# Patient Record
Sex: MALE | Race: BLACK OR AFRICAN AMERICAN | NOT HISPANIC OR LATINO | URBAN - METROPOLITAN AREA
[De-identification: names, ages, dates, MRNs, and addresses within clinical notes are randomized per-mention and may not be internally consistent; named-entity substitution may affect disease eponyms.]

---

## 2018-03-22 ENCOUNTER — INPATIENT (INPATIENT)
Facility: HOSPITAL | Age: 68
LOS: 1 days | Discharge: HOME | End: 2018-03-24
Attending: HOSPITALIST | Admitting: HOSPITALIST
Payer: MEDICARE

## 2018-03-22 VITALS
TEMPERATURE: 99 F | RESPIRATION RATE: 16 BRPM | SYSTOLIC BLOOD PRESSURE: 136 MMHG | DIASTOLIC BLOOD PRESSURE: 98 MMHG | HEART RATE: 77 BPM | OXYGEN SATURATION: 100 %

## 2018-03-22 DIAGNOSIS — R73.9 HYPERGLYCEMIA, UNSPECIFIED: ICD-10-CM

## 2018-03-22 LAB
ALBUMIN SERPL ELPH-MCNC: 4.5 G/DL — SIGNIFICANT CHANGE UP (ref 3.3–5)
ALP SERPL-CCNC: 90 U/L — SIGNIFICANT CHANGE UP (ref 40–120)
ALT FLD-CCNC: 39 U/L — SIGNIFICANT CHANGE UP (ref 4–41)
AST SERPL-CCNC: 30 U/L — SIGNIFICANT CHANGE UP (ref 4–40)
B-OH-BUTYR SERPL-SCNC: 5.2 MMOL/L — HIGH (ref 0–0.4)
BASE EXCESS BLDV CALC-SCNC: -1.2 MMOL/L — SIGNIFICANT CHANGE UP
BASE EXCESS BLDV CALC-SCNC: 1.3 MMOL/L — SIGNIFICANT CHANGE UP
BASOPHILS # BLD AUTO: 0.01 K/UL — SIGNIFICANT CHANGE UP (ref 0–0.2)
BASOPHILS NFR BLD AUTO: 0.2 % — SIGNIFICANT CHANGE UP (ref 0–2)
BILIRUB SERPL-MCNC: 0.6 MG/DL — SIGNIFICANT CHANGE UP (ref 0.2–1.2)
BLOOD GAS VENOUS - CREATININE: 1.17 MG/DL — SIGNIFICANT CHANGE UP (ref 0.5–1.3)
BLOOD GAS VENOUS - CREATININE: 1.46 MG/DL — HIGH (ref 0.5–1.3)
BUN SERPL-MCNC: 28 MG/DL — HIGH (ref 7–23)
BUN SERPL-MCNC: 35 MG/DL — HIGH (ref 7–23)
CALCIUM SERPL-MCNC: 8.8 MG/DL — SIGNIFICANT CHANGE UP (ref 8.4–10.5)
CALCIUM SERPL-MCNC: 9.7 MG/DL — SIGNIFICANT CHANGE UP (ref 8.4–10.5)
CHLORIDE BLDV-SCNC: 100 MMOL/L — SIGNIFICANT CHANGE UP (ref 96–108)
CHLORIDE BLDV-SCNC: 93 MMOL/L — LOW (ref 96–108)
CHLORIDE SERPL-SCNC: 82 MMOL/L — LOW (ref 98–107)
CHLORIDE SERPL-SCNC: 92 MMOL/L — LOW (ref 98–107)
CO2 SERPL-SCNC: 20 MMOL/L — LOW (ref 22–31)
CO2 SERPL-SCNC: 22 MMOL/L — SIGNIFICANT CHANGE UP (ref 22–31)
CREAT SERPL-MCNC: 1.46 MG/DL — HIGH (ref 0.5–1.3)
CREAT SERPL-MCNC: 1.81 MG/DL — HIGH (ref 0.5–1.3)
EOSINOPHIL # BLD AUTO: 0.12 K/UL — SIGNIFICANT CHANGE UP (ref 0–0.5)
EOSINOPHIL NFR BLD AUTO: 1.8 % — SIGNIFICANT CHANGE UP (ref 0–6)
GAS PNL BLDV: 129 MMOL/L — LOW (ref 136–146)
GAS PNL BLDV: 134 MMOL/L — LOW (ref 136–146)
GLUCOSE BLDV-MCNC: 352 — HIGH (ref 70–99)
GLUCOSE BLDV-MCNC: 655 — CRITICAL HIGH (ref 70–99)
GLUCOSE SERPL-MCNC: 342 MG/DL — HIGH (ref 70–99)
GLUCOSE SERPL-MCNC: 627 MG/DL — CRITICAL HIGH (ref 70–99)
HBA1C BLD-MCNC: 15.8 % — HIGH (ref 4–5.6)
HCO3 BLDV-SCNC: 22 MMOL/L — SIGNIFICANT CHANGE UP (ref 20–27)
HCO3 BLDV-SCNC: 23 MMOL/L — SIGNIFICANT CHANGE UP (ref 20–27)
HCT VFR BLD CALC: 49 % — SIGNIFICANT CHANGE UP (ref 39–50)
HCT VFR BLDV CALC: 45 % — SIGNIFICANT CHANGE UP (ref 39–51)
HCT VFR BLDV CALC: 49.8 % — SIGNIFICANT CHANGE UP (ref 39–51)
HGB BLD-MCNC: 15.8 G/DL — SIGNIFICANT CHANGE UP (ref 13–17)
HGB BLDV-MCNC: 14.7 G/DL — SIGNIFICANT CHANGE UP (ref 13–17)
HGB BLDV-MCNC: 16.3 G/DL — SIGNIFICANT CHANGE UP (ref 13–17)
IMM GRANULOCYTES # BLD AUTO: 0.02 # — SIGNIFICANT CHANGE UP
IMM GRANULOCYTES NFR BLD AUTO: 0.3 % — SIGNIFICANT CHANGE UP (ref 0–1.5)
LACTATE BLDV-MCNC: 3 MMOL/L — HIGH (ref 0.5–2)
LACTATE BLDV-MCNC: 4.6 MMOL/L — CRITICAL HIGH (ref 0.5–2)
LYMPHOCYTES # BLD AUTO: 1.46 K/UL — SIGNIFICANT CHANGE UP (ref 1–3.3)
LYMPHOCYTES # BLD AUTO: 22.5 % — SIGNIFICANT CHANGE UP (ref 13–44)
MAGNESIUM SERPL-MCNC: 2.5 MG/DL — SIGNIFICANT CHANGE UP (ref 1.6–2.6)
MCHC RBC-ENTMCNC: 27.6 PG — SIGNIFICANT CHANGE UP (ref 27–34)
MCHC RBC-ENTMCNC: 32.2 % — SIGNIFICANT CHANGE UP (ref 32–36)
MCV RBC AUTO: 85.5 FL — SIGNIFICANT CHANGE UP (ref 80–100)
MONOCYTES # BLD AUTO: 0.57 K/UL — SIGNIFICANT CHANGE UP (ref 0–0.9)
MONOCYTES NFR BLD AUTO: 8.8 % — SIGNIFICANT CHANGE UP (ref 2–14)
NEUTROPHILS # BLD AUTO: 4.31 K/UL — SIGNIFICANT CHANGE UP (ref 1.8–7.4)
NEUTROPHILS NFR BLD AUTO: 66.4 % — SIGNIFICANT CHANGE UP (ref 43–77)
NRBC # FLD: 0 — SIGNIFICANT CHANGE UP
PCO2 BLDV: 45 MMHG — SIGNIFICANT CHANGE UP (ref 41–51)
PCO2 BLDV: 53 MMHG — HIGH (ref 41–51)
PH BLDV: 7.32 PH — SIGNIFICANT CHANGE UP (ref 7.32–7.43)
PH BLDV: 7.34 PH — SIGNIFICANT CHANGE UP (ref 7.32–7.43)
PHOSPHATE SERPL-MCNC: 3.5 MG/DL — SIGNIFICANT CHANGE UP (ref 2.5–4.5)
PLATELET # BLD AUTO: 223 K/UL — SIGNIFICANT CHANGE UP (ref 150–400)
PMV BLD: 11.7 FL — SIGNIFICANT CHANGE UP (ref 7–13)
PO2 BLDV: 24 MMHG — LOW (ref 35–40)
PO2 BLDV: 35 MMHG — SIGNIFICANT CHANGE UP (ref 35–40)
POTASSIUM BLDV-SCNC: 3.2 MMOL/L — LOW (ref 3.4–4.5)
POTASSIUM BLDV-SCNC: 3.7 MMOL/L — SIGNIFICANT CHANGE UP (ref 3.4–4.5)
POTASSIUM SERPL-MCNC: 3.6 MMOL/L — SIGNIFICANT CHANGE UP (ref 3.5–5.3)
POTASSIUM SERPL-MCNC: 4.1 MMOL/L — SIGNIFICANT CHANGE UP (ref 3.5–5.3)
POTASSIUM SERPL-SCNC: 3.6 MMOL/L — SIGNIFICANT CHANGE UP (ref 3.5–5.3)
POTASSIUM SERPL-SCNC: 4.1 MMOL/L — SIGNIFICANT CHANGE UP (ref 3.5–5.3)
PROT SERPL-MCNC: 9 G/DL — HIGH (ref 6–8.3)
RBC # BLD: 5.73 M/UL — SIGNIFICANT CHANGE UP (ref 4.2–5.8)
RBC # FLD: 12.9 % — SIGNIFICANT CHANGE UP (ref 10.3–14.5)
SAO2 % BLDV: 30.6 % — LOW (ref 60–85)
SAO2 % BLDV: 56.5 % — LOW (ref 60–85)
SODIUM SERPL-SCNC: 132 MMOL/L — LOW (ref 135–145)
SODIUM SERPL-SCNC: 137 MMOL/L — SIGNIFICANT CHANGE UP (ref 135–145)
WBC # BLD: 6.49 K/UL — SIGNIFICANT CHANGE UP (ref 3.8–10.5)
WBC # FLD AUTO: 6.49 K/UL — SIGNIFICANT CHANGE UP (ref 3.8–10.5)

## 2018-03-22 RX ORDER — INSULIN HUMAN 100 [IU]/ML
7 INJECTION, SOLUTION SUBCUTANEOUS ONCE
Qty: 0 | Refills: 0 | Status: COMPLETED | OUTPATIENT
Start: 2018-03-22 | End: 2018-03-22

## 2018-03-22 RX ORDER — SODIUM CHLORIDE 9 MG/ML
1000 INJECTION, SOLUTION INTRAVENOUS
Qty: 0 | Refills: 0 | Status: DISCONTINUED | OUTPATIENT
Start: 2018-03-22 | End: 2018-03-23

## 2018-03-22 RX ORDER — HEPARIN SODIUM 5000 [USP'U]/ML
5000 INJECTION INTRAVENOUS; SUBCUTANEOUS EVERY 8 HOURS
Qty: 0 | Refills: 0 | Status: DISCONTINUED | OUTPATIENT
Start: 2018-03-22 | End: 2018-03-23

## 2018-03-22 RX ORDER — SODIUM CHLORIDE 9 MG/ML
2000 INJECTION INTRAMUSCULAR; INTRAVENOUS; SUBCUTANEOUS ONCE
Qty: 0 | Refills: 0 | Status: COMPLETED | OUTPATIENT
Start: 2018-03-22 | End: 2018-03-22

## 2018-03-22 RX ORDER — INSULIN GLARGINE 100 [IU]/ML
12 INJECTION, SOLUTION SUBCUTANEOUS ONCE
Qty: 0 | Refills: 0 | Status: COMPLETED | OUTPATIENT
Start: 2018-03-22 | End: 2018-03-22

## 2018-03-22 RX ORDER — SODIUM CHLORIDE 9 MG/ML
1000 INJECTION INTRAMUSCULAR; INTRAVENOUS; SUBCUTANEOUS ONCE
Qty: 0 | Refills: 0 | Status: COMPLETED | OUTPATIENT
Start: 2018-03-22 | End: 2018-03-22

## 2018-03-22 RX ORDER — POTASSIUM CHLORIDE 20 MEQ
40 PACKET (EA) ORAL ONCE
Qty: 0 | Refills: 0 | Status: COMPLETED | OUTPATIENT
Start: 2018-03-22 | End: 2018-03-22

## 2018-03-22 RX ADMIN — INSULIN GLARGINE 12 UNIT(S): 100 INJECTION, SOLUTION SUBCUTANEOUS at 21:28

## 2018-03-22 RX ADMIN — SODIUM CHLORIDE 125 MILLILITER(S): 9 INJECTION, SOLUTION INTRAVENOUS at 21:04

## 2018-03-22 RX ADMIN — INSULIN HUMAN 7 UNIT(S): 100 INJECTION, SOLUTION SUBCUTANEOUS at 16:25

## 2018-03-22 RX ADMIN — SODIUM CHLORIDE 1000 MILLILITER(S): 9 INJECTION INTRAMUSCULAR; INTRAVENOUS; SUBCUTANEOUS at 18:40

## 2018-03-22 RX ADMIN — SODIUM CHLORIDE 2000 MILLILITER(S): 9 INJECTION INTRAMUSCULAR; INTRAVENOUS; SUBCUTANEOUS at 15:19

## 2018-03-22 RX ADMIN — Medication 40 MILLIEQUIVALENT(S): at 21:05

## 2018-03-22 NOTE — ED ADULT NURSE NOTE - CHIEF COMPLAINT
The patient is a 67y Male complaining of hyperglycemia from pmd office, unable to read.  Pt has not seen a doctor at all, today was brought by friend for polyuria, polydypsia x months.  No pmh, not on any meds.  Pt denies any distress, pain, blurry vision.

## 2018-03-22 NOTE — ED PROVIDER NOTE - ATTENDING CONTRIBUTION TO CARE
Dr. Moscoso:  I have personally performed a face to face bedside history and physical examination of this patient. I have discussed the history, examination, review of systems, assessment and plan of management with the resident. I have reviewed the electronic medical record and amended it to reflect my history, review of systems, physical exam, assessment and plan.    67M sent in by PCP for high blood sugar in office. Pt endorses feeling weak/tired/malaise over past month.  + Polyuria and polydipsia.  Denies fever/chills, cp, sob, n/v/d.  States some years ago, told he had borderline DM.  Not taking meds now.    Exam:  - nad  - rrr  - ctab  - abd soft ntnd    A/P  - hyperglycemia, r/o DKA  - cbc, cmp, vbg, beta-hydroxybutyrate, a1c, ua, urine culture  - IVF

## 2018-03-22 NOTE — H&P ADULT - HISTORY OF PRESENT ILLNESS
Patient is a 68 y/o M w/ a PMHx of HTN and T2DM who was sent to the ED by his PMD for hyperglycemia. Patient reports that he was in his usual state of health until 2-3 weeks ago when he began to develop vague abdominal complaints. He explains that he started to feel a "shaking" diffusely throughout his abdomen which felt very strange. The sensation was intermittent and he was unsure of any aggravating/relieving factors. No known association with food. He took OTC DayQuil and Nyquil for his symptoms without relief. During this time, patient also reports worsening polyuria/polydipsia as well as fatigue. Since patient's symptoms were not improving, he scheduled an appointment with his PMD who he visited on day of presentation. Patient was found to have a FS > 500 and so he was advised to go to the ED for further evaluation. On ROS, patient endorses dry mouth, but denies any recent fever, chills, headache, dizziness, vision changes, chest pain, palpitations, shortness of breath, cough, vomiting, diarrhea, or dysuria. Of note, patient states that his PMD prescribes him Atenolol-Chlorthalidone (100mg/25mg) for HTN and Glipizide (unknown dose) for T2DM; however, he reports that he has seldom been taking these medications. He reports that he recently visited his family in [facility] in 1/2018.    In the ED, patient's VS were: T = 98.7F, HR = 77, BP = 136/98, RR = 16, O2 Sat = 100% on RA. Labs were significant for hyperglycemia (serum glucose = 627), AIYANA (Cr = 1.81), AG = 30, Beta-Hydroxybutyrate of 5.2, lactate of 4.6, pH of 7.34, and a Hemoglobin A1c of 15.8%. Patient was given 3L NS, IV Regular Insulin 7U x1, Lantus 12U x1, PO KCl 40 mEq x1, and was started on LR @ 125cc/hr. Patient's serum glucose improved to 342, AG improved to 23, and lactate improved to 3.0. He was then admitted to Medicine for further management. Patient is a 68 y/o M w/ a PMHx of HTN and T2DM who was sent to the ED by his PMD for hyperglycemia. Patient reports that he was in his usual state of health until 2-3 weeks ago when he began to develop vague abdominal complaints. He explains that he started to feel a "shaking" diffusely throughout his abdomen which felt very strange. The sensation was intermittent and he was unsure of any aggravating/relieving factors. No known association with food. He took OTC DayQuil and Nyquil for his symptoms without relief. During this time, patient also reports worsening polyuria/polydipsia as well as fatigue. Since patient's symptoms were not improving, he scheduled an appointment with his PMD who he visited on day of presentation. Patient was found to have a FS > 500 and so he was advised to go to the ED for further evaluation. On ROS, patient endorses dry mouth, but denies any recent fever, chills, headache, dizziness, vision changes, chest pain, palpitations, shortness of breath, cough, vomiting, diarrhea, or dysuria. Of note, patient states that his PMD prescribes him Atenolol-Chlorthalidone (100mg/25mg) for HTN and Glipizide (unknown dose) for T2DM; however, he reports that he has seldom been taking these medications. He reports that he recently visited his family in [facility] in 1/2018.    In the ED, patient's VS were: T = 98.7F, HR = 77, BP = 136/98, RR = 16, O2 Sat = 100% on RA. Labs were significant for hyperglycemia (serum glucose = 627), AIYANA (Cr = 1.81), AG = 30, Beta-Hydroxybutyrate of 5.2, lactate of 4.6, pH of 7.34, and a Hemoglobin A1c of 15.8%. Patient was given 3L NS, IV Regular Insulin 7U x1, Lantus 12U x1, PO KCl 40 mEq x1, and was started on LR @ 125cc/hr. Patient's serum glucose improved to 342, AG improved to 23, and lactate improved to 3.0. He was then admitted to Medicine for further management. Patient is a 66 y/o M w/ a PMHx of HTN and T2DM who was sent to the ED by his PMD for hyperglycemia. Patient reports that he was in his usual state of health until 2-3 weeks ago when he began to develop vague abdominal complaints. He explains that he started to feel a "shaking" diffusely throughout his abdomen which felt very strange. The sensation was intermittent and he was unsure of any aggravating/relieving factors. No known association with food. He states that he was drinking a lot of juice and taking OTC DayQuil and Nyquil for his symptoms without relief. During this time, patient also reports worsening polyuria/polydipsia as well as fatigue. Since patient's symptoms were not improving, he scheduled an appointment with his PMD who he visited on day of presentation. Patient was found to have a FS > 500 and so he was advised to go to the ED for further evaluation. On ROS, patient endorses dry mouth, but denies any recent fever, chills, headache, dizziness, vision changes, chest pain, palpitations, shortness of breath, cough, vomiting, diarrhea, or dysuria. Of note, patient states that his PMD prescribes him Atenolol-Chlorthalidone (100mg/25mg) for HTN and Glipizide (unknown dose) for T2DM; however, he reports that he has seldom been taking these medications. He reports that he recently visited his family in [facility] in 1/2018.    In the ED, patient's VS were: T = 98.7F, HR = 77, BP = 136/98, RR = 16, O2 Sat = 100% on RA. Labs were significant for hyperglycemia (serum glucose = 627), AIYANA (Cr = 1.81), AG = 30, Beta-Hydroxybutyrate of 5.2, lactate of 4.6, pH of 7.34, and a Hemoglobin A1c of 15.8%. Patient was given 3L NS, IV Regular Insulin 7U x1, Lantus 12U x1, PO KCl 40 mEq x1, and was started on LR @ 125cc/hr. Patient's serum glucose improved to 342, AG improved to 23, and lactate improved to 3.0. He was then admitted to Medicine for further management.

## 2018-03-22 NOTE — H&P ADULT - NSHPPHYSICALEXAM_GEN_ALL_CORE
Vital Signs Last 24 Hrs  T(C): 36.9 (22 Mar 2018 22:47), Max: 37.1 (22 Mar 2018 13:58)  T(F): 98.5 (22 Mar 2018 22:47), Max: 98.7 (22 Mar 2018 13:58)  HR: 74 (22 Mar 2018 22:47) (72 - 77)  BP: 128/88 (22 Mar 2018 22:47) (128/88 - 161/99)  BP(mean): --  RR: 18 (22 Mar 2018 22:47) (16 - 20)  SpO2: 100% (22 Mar 2018 22:47) (100% - 100%)    PHYSICAL EXAM:  Constitutional:  Eyes:  ENMT:  Neck:  Breasts:  Back:  Respiratory:  Cardiovascular:  Gastrointestinal:  Genitourinary:  Rectal:  Extremities:  Vascular:  Neurological:  Skin:  Lymph Nodes:  Musculoskeletal:  Psychiatric: Vital Signs Last 24 Hrs  T(C): 36.9 (22 Mar 2018 22:47), Max: 37.1 (22 Mar 2018 13:58)  T(F): 98.5 (22 Mar 2018 22:47), Max: 98.7 (22 Mar 2018 13:58)  HR: 74 (22 Mar 2018 22:47) (72 - 77)  BP: 128/88 (22 Mar 2018 22:47) (128/88 - 161/99)  BP(mean): --  RR: 18 (22 Mar 2018 22:47) (16 - 20)  SpO2: 100% (22 Mar 2018 22:47) (100% - 100%)    PHYSICAL EXAM:  Constitutional: NAD  HEENT: NC/AT; Dry mucous membranes  Neck: Supple  Respiratory: CTAB; No wheeze or rhonchi appreciated  Cardiovascular: RRR; +S1/S2  Gastrointestinal: +BS, Soft, NT, No rigidity  Genitourinary: No Norman, No suprapubic TTP  Extremities: No peripheral edema  Neurological: A+Ox3, Nonfocal  Skin: Warm and dry  Psychiatric: Normal mood and affect

## 2018-03-22 NOTE — ED ADULT NURSE REASSESSMENT NOTE - NS ED NURSE REASSESS COMMENT FT1
PT is waiting for MICU consult to r/o DKA.  Anion gap 17.7, improving 14.2 after 2 Liters of NS.  Resting stable.

## 2018-03-22 NOTE — CONSULT NOTE ADULT - ATTENDING COMMENTS
Hyperglycemia, with Anion gap acidosis in the setting of elevated lactate and AIYANA, possible mild DKA  IVF would switch to LR  Lantus, RISS, FS  serial labs, aggressive electrolyte replacement  diabetes education  endocrine eval  r/o underlying infection

## 2018-03-22 NOTE — H&P ADULT - PROBLEM SELECTOR PLAN 2
- Patient found to have an AGMA on presentation likely 2/2 AIYANA, elevated lactate, and possible mild DKA.  - AG currently improving w/ IVF and Insulin. C/w diabetes management as noted above  - Monitor BMP - Patient found to have a high anion gap metabolic acidosis on presentation likely 2/2 AIYANA, elevated lactate, and possible mild DKA.  - AG currently improving w/ IVF and Insulin. C/w diabetes management as noted above  - Monitor BMP

## 2018-03-22 NOTE — H&P ADULT - ASSESSMENT
Patient is a 66 y/o M w/ a PMHx of HTN and T2DM who was sent to the ED by his PMD for hyperglycemia, found to have AGMA a/w AIYANA and elevated lactate 2/2 uncontrolled DM.

## 2018-03-22 NOTE — H&P ADULT - PROBLEM SELECTOR PLAN 1
- Patient sent to ED from PMD after he was found to have a FS > 500 and was subsequently found to have a Beta-Hydroxybutyrate of 5.2 and a Hemoglobin A1c of 15.8%. He reports worsening polyuria/polydipsia over the past few weeks. Suspect patient's hyperglycemia is likely 2/2 nonadherence as he reports that he had seldom been taking his prescribed Glipizide. Patient denies any infectious symptoms at this time, including fever, chills, cough, vomiting, or diarrhea. Will check a UA. He denies any chest pain as well, but will check an EKG to evaluate for possible ischemia.  - Patient given IV Regular Insulin 7U x1 and Lantus 12U x1 with improvement. Will c/w Lantus 12U QHS for now and start patient on HISS. Will monitor FS.  - Patient given 3L NS in the ED and started on LR @ 125cc/hr. Patient continues to appear dehydrated. Will c/w aggressive IV hydration and monitor fluid status closely.  - Monitor BMP  - Endocrine consult in the AM

## 2018-03-22 NOTE — ED PROVIDER NOTE - PROGRESS NOTE DETAILS
initial labs show AG 30, elevated ketone, treated with IV insulin and fluids, repeat labs showed improvement of serum glucose and AG to 23. Seen by micu who recc treatment with lantus and admission to floor, d/w hospitalist who agreed

## 2018-03-22 NOTE — CONSULT NOTE ADULT - ASSESSMENT
67M h/o HTN sent from PMD for elevated blood sugar and fatigue found to have new onset diabetes w/ hyperglycemia and anion gap acidosis likely from dehydration, and mild ketoacidosis s/p 3 L of IVF, and insulin 7 w/ improvement of anion gap to 23.       1. Neuro:  No acute issues  -c/w monitoring   2. CV:  HTN: hx of HTN   - c/w current home medications   - trend bmp/ mag and phosp     3. Pulm:  no acute issues    4. GI:  NPO for now then  diabetic diet    5. Renal/Metabolic:  lactic acidosis: likely 2/2 dehydration   - c/w IVF w/ LR at 125ml/hr   AIYANA: likely prerenal   - repeat BMP w mag and phosp q 4 hours until gap closed  - replete k+, mg2+, phosp   - would check urine electrolytes and creatine for FENA likely prerenal 2/2 dehydration  - would check UA     6. ID:  Afebrile , no leukocytosis   7. Heme:   No acute issues  consider additional cbc after fluid administartion     8. Endo:  New onset DM   : patient w/ anion gap of 23 after 7 units of IV insulin regular and 3 L of IVF  - would dose lantus at 0.1 units per kg , ~12 units lantus now   - ISS   - BMP w/ Mag phosp q 4 hours  - LR at 125ml/hr   POC glucose q 2 hours for 4 hours then q 6  NPO until gap <16 then consistent carb diet w/o snack  -endocrinology consult for DM w/ hgab1c of     10. Dispo:  Repeat BMP w mag phosp  after 12 lantus and further IV fluid resucitation w/ LR at 125 67M h/o HTN sent from PMD for elevated blood sugar and fatigue found to have new onset diabetes w/ hyperglycemia and anion gap acidosis likely from dehydration, and mild ketoacidosis s/p 3 L of IVF, and insulin 7 w/ improvement of anion gap to 23.       1. Neuro:  No acute issues  -c/w monitoring   2. CV:  HTN: hx of HTN   - previously on atenelol 10mg chlorothalidone 25mg   - would hold chorothalidone as pt dehydrated  - woud give amlodipine 10mg x1   - trend bmp/ mag and phosp     3. Pulm:  no acute issues    4. GI:  NPO for now then  diabetic diet    5. Renal/Metabolic:  lactic acidosis: likely 2/2 dehydration   - c/w IVF w/ LR at 125ml/hr   AIYANA: likely prerenal   - repeat BMP w mag and phosp q 4 hours until gap closed  - replete k+, mg2+, phosp   - would check urine electrolytes and creatine for FENA likely prerenal 2/2 dehydration  - would check UA     6. ID:  Afebrile , no leukocytosis   7. Heme:   No acute issues  consider additional cbc after fluid administartion     8. Endo:  New onset DM   : patient w/ anion gap of 23 after 7 units of IV insulin regular and 3 L of IVF  - would dose lantus at 0.1-0.2 units per kg , consider aiyana and new DM would dose 12 units lantus now   - ISS low dose q 6 which NPO   - BMP w/ Mag phosp q 4 hours  - LR at 125ml/hr   POC glucose q 2 hours for 4 hours then q 6  NPO until gap <16 then consistent carb diet w/o snack  -endocrinology consult for DM w/ hgab1c of     10. Dispo:  Repeat BMP w mag phosp  after 12 lantus and further IV fluid resucitation w/ LR at 125

## 2018-03-22 NOTE — H&P ADULT - PROBLEM SELECTOR PLAN 3
- Likely prerenal in origin given profound dehydration from uncontrolled DM. Cr is currently improving s/p IVF.   - C/w aggressive IVF as noted above  - Monitor BMP

## 2018-03-22 NOTE — H&P ADULT - PROBLEM SELECTOR PLAN 4
- Patient is prescribed Atenolol-Chlorthalidone (100mg-25mg) as an outpatient which he reports he seldom takes. Since patient is currently normotensive, will hold off on starting BP meds for now. Given patient's DM, would prefer to switch patient to an ACEi; however, unable to start for now given patient's AIYANA.  - Monitor BP

## 2018-03-22 NOTE — H&P ADULT - NSHPLABSRESULTS_GEN_ALL_CORE
CBC Full  -  ( 22 Mar 2018 14:30 )  WBC Count : 6.49 K/uL  Hemoglobin : 15.8 g/dL  Hematocrit : 49.0 %  Platelet Count - Automated : 223 K/uL  Mean Cell Volume : 85.5 fL  Mean Cell Hemoglobin : 27.6 pg  Mean Cell Hemoglobin Concentration : 32.2 %  Auto Neutrophil # : 4.31 K/uL  Auto Lymphocyte # : 1.46 K/uL  Auto Monocyte # : 0.57 K/uL  Auto Eosinophil # : 0.12 K/uL  Auto Basophil # : 0.01 K/uL  Auto Neutrophil % : 66.4 %  Auto Lymphocyte % : 22.5 %  Auto Monocyte % : 8.8 %  Auto Eosinophil % : 1.8 %  Auto Basophil % : 0.2 %    03-22    137  |  92<L>  |  28<H>  ----------------------------<  342<H>  3.6   |  22  |  1.46<H>    Ca    8.8      22 Mar 2018 18:00  Phos  3.5     03-22  Mg     2.5     03-22    TPro  9.0<H>  /  Alb  4.5  /  TBili  0.6  /  DBili  x   /  AST  30  /  ALT  39  /  AlkPhos  90  03-22    EKG: Ordered

## 2018-03-22 NOTE — ED ADULT NURSE NOTE - OBJECTIVE STATEMENT
Alert, awake, oriented x4.  FSBS at triage 574mg/dl.  VSS.  IV accessed.  Labs sent.  Waiting for MD evaluation. Alert, awake, oriented x4.  FSBS at triage 564mg/dl.  VSS.  IV accessed.  Labs sent.  Waiting for MD evaluation.

## 2018-03-22 NOTE — ED PROVIDER NOTE - OBJECTIVE STATEMENT
67M h/o HTN sent from PMD for elevated blood sugar. Notes fatigue, malaise for past few weeks, polydipsia, polyuria. Denies F, cough, abd pain, N/v/D. 67M h/o HTN sent from PMD for elevated blood sugar. Notes fatigue, malaise for past few weeks, polydipsia, polyuria. Denies F, cough, abd pain, N/v/D. Pt was prescribed glipizide years ago but never took medication.

## 2018-03-22 NOTE — H&P ADULT - NSHPSOCIALHISTORY_GEN_ALL_CORE
Seldom EtOH use, Denies history of smoking or illicit drug use  Originally from [place]. Lives at home alone. Wife and 2 kids are currently in [place]  Works as a

## 2018-03-22 NOTE — ED ADULT NURSE NOTE - CHIEF COMPLAINT QUOTE
Pt. OCTAVIO from MD for hyperglycemia. Glucometer read "high" in office. Returned from [facility] 2 weeks ago. c/o weakness since then. PMHx: HTN, DM vw=258

## 2018-03-22 NOTE — CONSULT NOTE ADULT - SUBJECTIVE AND OBJECTIVE BOX
CHIEF COMPLAINT:    HPI:  67M h/o HTN sent from PMD for elevated blood sugar. Patient states that he has been more fatigued that usual and has been having increased thirst with increased urination. Patient was recently in [facility] 2 weeks ago.and has been fatigued since his visit.  He denies fever chills, chest pain , SOB, nausea, vomiting.     ED course: found to have POC glucose of 564. . found to have a anion gap of 30 initially w/ lactate of 4.6 and betahydroxybutarate of 4.6 . He was given 7 units IVP of regular insulin and3 L of NS with improvement of his blood sugar to 342 and anion gap to 23, and lactate of 3.0 w/p pH of 7.32. [id number] of 15.8 w likely new onset diabetes.     PAST MEDICAL & SURGICAL HISTORY:      FAMILY HISTORY:      SOCIAL HISTORY:  Smoking: [ ] Never Smoked [ ] Former Smoker (__ packs x ___ years) [ ] Current Smoker  (__ packs x ___ years)  Substance Use: [ ] Never Used [ ] Used ____  EtOH Use:  Marital Status: [ ] Single [ ]  [ ]  [ ]   Sexual History:   Occupation:  Recent Travel:  Country of Birth:  Advance Directives:    Allergies    No Known Allergies    Intolerances        HOME MEDICATIONS:  Home Medications:      REVIEW OF SYSTEMS:  Constitutional: [ ] negative [ ] fevers [ ] chills [ X] weight loss [ ] weight gain  HEENT: [ X] negative [ ] dry eyes [ ] eye irritation [ ] postnasal drip [ ] nasal congestion  CV: [ X] negative  [ ] chest pain [ ] orthopnea [ ] palpitations [ ] murmur  Resp: [X ] negative [ ] cough [ ] shortness of breath [ ] dyspnea [ ] wheezing [ ] sputum [ ] hemoptysis  GI: [ ] negative [X ] nausea [ ] vomiting [ ] diarrhea [ ] constipation [ ] abd pain [ ] dysphagia   : [ ] negative [ ] dysuria [ X] nocturia [ ] hematuria [X ] increased urinary frequency  Musculoskeletal: [ X] negative [ ] back pain [ ] myalgias [ ] arthralgias [ ] fracture  Skin: [X ] negative [ ] rash [ ] itch  Neurological: [ ] negative [ ] headache [ ] dizziness [ ] syncope [X ] weakness [ ] numbness  Psychiatric: [X ] negative [ ] anxiety [ ] depression  Endocrine: [ ] negative [ ] diabetes [ ] thyroid problem  Hematologic/Lymphatic: [X ] negative [ ] anemia [ ] bleeding problem  Allergic/Immunologic: [ ] negative [ ] itchy eyes [ ] nasal discharge [ ] hives [ ] angioedema  [ ] All other systems negative  [ ] Unable to assess ROS because ________    OBJECTIVE:  ICU Vital Signs Last 24 Hrs  T(C): 36.7 (22 Mar 2018 14:51), Max: 37.1 (22 Mar 2018 13:58)  T(F): 98 (22 Mar 2018 14:51), Max: 98.7 (22 Mar 2018 13:58)  HR: 74 (22 Mar 2018 14:51) (74 - 77)  BP: 161/99 (22 Mar 2018 14:51) (136/98 - 161/99)  BP(mean): --  ABP: --  ABP(mean): --  RR: 20 (22 Mar 2018 14:51) (16 - 20)  SpO2: 100% (22 Mar 2018 14:51) (100% - 100%)        CAPILLARY BLOOD GLUCOSE      POCT Blood Glucose.: 350 mg/dL (22 Mar 2018 17:47)      PHYSICAL EXAM:  GENERAL: NAD, well-developed  HEAD:  Atraumatic, Normocephalic  EYES: EOMI, PERRLA, conjunctiva and sclera clear  NECK: Supple, No JVD  CHEST/LUNG: Clear to auscultation bilaterally; No wheeze  HEART: Regular rate and rhythm; No murmurs, rubs, or gallops  ABDOMEN: Soft, Nontender, Nondistended; Bowel sounds present  EXTREMITIES:  2+ Peripheral Pulses, No clubbing, cyanosis, or edema  PSYCH: AAOx3  NEUROLOGY: non-focal  SKIN: No rashes or lesionsGeneral:     LINES: [facility]    HOSPITAL MEDICATIONS:  Standing Meds:      PRN Meds:      LABS:                        15.8   6.49  )-----------( 223      ( 22 Mar 2018 14:30 )             49.0     Hgb Trend: 15.8<--  03-22    137  |  92<L>  |  28<H>  ----------------------------<  342<H>  3.6   |  22  |  1.46<H>    Ca    8.8      22 Mar 2018 18:00  Phos  3.5     03-22  Mg     2.5     03-22    TPro  9.0<H>  /  Alb  4.5  /  TBili  0.6  /  DBili  x   /  AST  30  /  ALT  39  /  AlkPhos  90  03-22    Creatinine Trend: 1.46<--, 1.81<--        Venous Blood Gas:  03-22 @ 18:00  7.32/53/24/23/30.6  VBG Lactate: 3.0  Venous Blood Gas:  03-22 @ 14:30  7.34/45/35/22/56.5  VBG Lactate: 4.6      MICROBIOLOGY:       RADIOLOGY:  [ ] Reviewed and interpreted by me    EKG: CHIEF COMPLAINT: fatigue    HPI:  67M h/o HTN , ? DM sent from PMD for elevated blood sugar. Patient states that he has been more fatigued that usual and has been having increased thirst with increased urination. Patient was recently in [facility] 2 weeks ago.and has been fatigued since his visit.  He denies fever chills, chest pain , SOB, nausea, vomiting. He states he was previously prescribed glipizide but has not been taking it. He states he has not been taking his B/P medication recently as well .     ED course: found to have POC glucose of 564. . found to have a anion gap of 30 initially w/ lactate of 4.6 and betahydroxybutarate of 4.6 . He was given 7 units IVP of regular insulin and3 L of NS with improvement of his blood sugar to 342 and anion gap to 23, and lactate of 3.0 w/p pH of 7.32. [id number] of 15.8 w likely new onset diabetes.     PAST MEDICAL & SURGICAL HISTORY:  HTN  ?DM  No surgical hx   FAMILY HISTORY:  None     SOCIAL HISTORY:  Smoking: [ XNever Smoked [ ] Former Smoker (__ packs x ___ years) [ ] Current Smoker  (__ packs x ___ years)  Substance Use: [ ] Never Used [ ] Used ____  EtOH Use: social   Marital Status: [ ] Single [X ]  [ ]  [ ]     Allergies    No Known Allergies    Intolerances        HOME MEDICATIONS:  Home Medications:  atenelol 10mg , chlorothalidone 25mg     REVIEW OF SYSTEMS:  Constitutional: [ ] negative [ ] fevers [ ] chills [ X] weight loss [ ] weight gain  HEENT: [ X] negative [ ] dry eyes [ ] eye irritation [ ] postnasal drip [ ] nasal congestion  CV: [ X] negative  [ ] chest pain [ ] orthopnea [ ] palpitations [ ] murmur  Resp: [X ] negative [ ] cough [ ] shortness of breath [ ] dyspnea [ ] wheezing [ ] sputum [ ] hemoptysis  GI: [ ] negative [X ] nausea [ ] vomiting [ ] diarrhea [ ] constipation [ ] abd pain [ ] dysphagia   : [ ] negative [ ] dysuria [ X] nocturia [ ] hematuria [X ] increased urinary frequency  Musculoskeletal: [ X] negative [ ] back pain [ ] myalgias [ ] arthralgias [ ] fracture  Skin: [X ] negative [ ] rash [ ] itch  Neurological: [ ] negative [ ] headache [ ] dizziness [ ] syncope [X ] weakness [ ] numbness  Psychiatric: [X ] negative [ ] anxiety [ ] depression  Endocrine: [ ] negative [ ] diabetes [ ] thyroid problem  Hematologic/Lymphatic: [X ] negative [ ] anemia [ ] bleeding problem  Allergic/Immunologic: [ ] negative [ ] itchy eyes [ ] nasal discharge [ ] hives [ ] angioedema  [ ] All other systems negative  [ ] Unable to assess ROS because ________    OBJECTIVE:  ICU Vital Signs Last 24 Hrs  T(C): 36.7 (22 Mar 2018 14:51), Max: 37.1 (22 Mar 2018 13:58)  T(F): 98 (22 Mar 2018 14:51), Max: 98.7 (22 Mar 2018 13:58)  HR: 74 (22 Mar 2018 14:51) (74 - 77)  BP: 161/99 (22 Mar 2018 14:51) (136/98 - 161/99)  BP(mean): --  ABP: --  ABP(mean): --  RR: 20 (22 Mar 2018 14:51) (16 - 20)  SpO2: 100% (22 Mar 2018 14:51) (100% - 100%)        CAPILLARY BLOOD GLUCOSE      POCT Blood Glucose.: 350 mg/dL (22 Mar 2018 17:47)      PHYSICAL EXAM:  GENERAL: NAD, well-developed  HEAD:  Atraumatic, Normocephalic  EYES: EOMI, PERRLA, conjunctiva and sclera clear  NECK: Supple, No JVD  HEENT: dry mucosa , trachea midline , no LAD   CHEST/LUNG: Clear to auscultation bilaterally; No wheeze  HEART: Regular rate and rhythm; No murmurs, rubs, or gallops  ABDOMEN: Soft, Nontender, Nondistended; Bowel sounds present  EXTREMITIES:  2+ Peripheral Pulses, No clubbing, cyanosis, or edema  PSYCH: AAOx3  NEUROLOGY: non-focal  SKIN: No rashes or lesions    LINES: [facility]    HOSPITAL MEDICATIONS:  Standing Meds:      PRN Meds:      LABS:                        15.8   6.49  )-----------( 223      ( 22 Mar 2018 14:30 )             49.0     Hgb Trend: 15.8<--  03-22    137  |  92<L>  |  28<H>  ----------------------------<  342<H>  3.6   |  22  |  1.46<H>    Ca    8.8      22 Mar 2018 18:00  Phos  3.5     03-22  Mg     2.5     03-22    TPro  9.0<H>  /  Alb  4.5  /  TBili  0.6  /  DBili  x   /  AST  30  /  ALT  39  /  AlkPhos  90  03-22    Creatinine Trend: 1.46<--, 1.81<--        Venous Blood Gas:  03-22 @ 18:00  7.32/53/24/23/30.6  VBG Lactate: 3.0  Venous Blood Gas:  03-22 @ 14:30  7.34/45/35/22/56.5  VBG Lactate: 4.6      MICROBIOLOGY:       RADIOLOGY:  [ ] Reviewed and interpreted by me    EKG:

## 2018-03-22 NOTE — ED ADULT TRIAGE NOTE - CHIEF COMPLAINT QUOTE
Pt. OCTAVIO from MD for hyperglycemia. Glucometer read "high" in office. Returned from [facility] 2 weeks ago. c/o weakness since then. PMHx: HTN, DM hp=[id number]

## 2018-03-23 DIAGNOSIS — E78.4 OTHER HYPERLIPIDEMIA: ICD-10-CM

## 2018-03-23 DIAGNOSIS — E87.2 ACIDOSIS: ICD-10-CM

## 2018-03-23 DIAGNOSIS — E78.5 HYPERLIPIDEMIA, UNSPECIFIED: ICD-10-CM

## 2018-03-23 DIAGNOSIS — I10 ESSENTIAL (PRIMARY) HYPERTENSION: ICD-10-CM

## 2018-03-23 DIAGNOSIS — E11.10 TYPE 2 DIABETES MELLITUS WITH KETOACIDOSIS WITHOUT COMA: ICD-10-CM

## 2018-03-23 DIAGNOSIS — Z29.9 ENCOUNTER FOR PROPHYLACTIC MEASURES, UNSPECIFIED: ICD-10-CM

## 2018-03-23 DIAGNOSIS — N17.9 ACUTE KIDNEY FAILURE, UNSPECIFIED: ICD-10-CM

## 2018-03-23 DIAGNOSIS — E11.65 TYPE 2 DIABETES MELLITUS WITH HYPERGLYCEMIA: ICD-10-CM

## 2018-03-23 LAB
APPEARANCE UR: CLEAR — SIGNIFICANT CHANGE UP
BASOPHILS # BLD AUTO: 0.01 K/UL — SIGNIFICANT CHANGE UP (ref 0–0.2)
BASOPHILS NFR BLD AUTO: 0.1 % — SIGNIFICANT CHANGE UP (ref 0–2)
BILIRUB UR-MCNC: NEGATIVE — SIGNIFICANT CHANGE UP
BLOOD UR QL VISUAL: NEGATIVE — SIGNIFICANT CHANGE UP
BUN SERPL-MCNC: 17 MG/DL — SIGNIFICANT CHANGE UP (ref 7–23)
BUN SERPL-MCNC: 21 MG/DL — SIGNIFICANT CHANGE UP (ref 7–23)
BUN SERPL-MCNC: 23 MG/DL — SIGNIFICANT CHANGE UP (ref 7–23)
CALCIUM SERPL-MCNC: 8.6 MG/DL — SIGNIFICANT CHANGE UP (ref 8.4–10.5)
CALCIUM SERPL-MCNC: 8.7 MG/DL — SIGNIFICANT CHANGE UP (ref 8.4–10.5)
CALCIUM SERPL-MCNC: 8.8 MG/DL — SIGNIFICANT CHANGE UP (ref 8.4–10.5)
CHLORIDE SERPL-SCNC: 92 MMOL/L — LOW (ref 98–107)
CHLORIDE SERPL-SCNC: 95 MMOL/L — LOW (ref 98–107)
CHLORIDE SERPL-SCNC: 95 MMOL/L — LOW (ref 98–107)
CHOLEST SERPL-MCNC: 227 MG/DL — HIGH (ref 120–199)
CO2 SERPL-SCNC: 18 MMOL/L — LOW (ref 22–31)
CO2 SERPL-SCNC: 21 MMOL/L — LOW (ref 22–31)
CO2 SERPL-SCNC: 22 MMOL/L — SIGNIFICANT CHANGE UP (ref 22–31)
COLOR SPEC: SIGNIFICANT CHANGE UP
CREAT SERPL-MCNC: 1.16 MG/DL — SIGNIFICANT CHANGE UP (ref 0.5–1.3)
CREAT SERPL-MCNC: 1.16 MG/DL — SIGNIFICANT CHANGE UP (ref 0.5–1.3)
CREAT SERPL-MCNC: 1.26 MG/DL — SIGNIFICANT CHANGE UP (ref 0.5–1.3)
EOSINOPHIL # BLD AUTO: 0.38 K/UL — SIGNIFICANT CHANGE UP (ref 0–0.5)
EOSINOPHIL NFR BLD AUTO: 5.1 % — SIGNIFICANT CHANGE UP (ref 0–6)
GLUCOSE SERPL-MCNC: 209 MG/DL — HIGH (ref 70–99)
GLUCOSE SERPL-MCNC: 262 MG/DL — HIGH (ref 70–99)
GLUCOSE SERPL-MCNC: 339 MG/DL — HIGH (ref 70–99)
GLUCOSE UR-MCNC: >1000 — SIGNIFICANT CHANGE UP
HCT VFR BLD CALC: 44.4 % — SIGNIFICANT CHANGE UP (ref 39–50)
HDLC SERPL-MCNC: 40 MG/DL — SIGNIFICANT CHANGE UP (ref 35–55)
HGB BLD-MCNC: 14.3 G/DL — SIGNIFICANT CHANGE UP (ref 13–17)
IMM GRANULOCYTES # BLD AUTO: 0.03 # — SIGNIFICANT CHANGE UP
IMM GRANULOCYTES NFR BLD AUTO: 0.4 % — SIGNIFICANT CHANGE UP (ref 0–1.5)
KETONES UR-MCNC: SIGNIFICANT CHANGE UP
LACTATE SERPL-SCNC: 2.5 MMOL/L — HIGH (ref 0.5–2)
LACTATE SERPL-SCNC: 2.6 MMOL/L — HIGH (ref 0.5–2)
LEUKOCYTE ESTERASE UR-ACNC: NEGATIVE — SIGNIFICANT CHANGE UP
LIPID PNL WITH DIRECT LDL SERPL: 157 MG/DL — SIGNIFICANT CHANGE UP
LYMPHOCYTES # BLD AUTO: 2.07 K/UL — SIGNIFICANT CHANGE UP (ref 1–3.3)
LYMPHOCYTES # BLD AUTO: 27.6 % — SIGNIFICANT CHANGE UP (ref 13–44)
MAGNESIUM SERPL-MCNC: 2.1 MG/DL — SIGNIFICANT CHANGE UP (ref 1.6–2.6)
MAGNESIUM SERPL-MCNC: 2.3 MG/DL — SIGNIFICANT CHANGE UP (ref 1.6–2.6)
MAGNESIUM SERPL-MCNC: 2.3 MG/DL — SIGNIFICANT CHANGE UP (ref 1.6–2.6)
MCHC RBC-ENTMCNC: 27.8 PG — SIGNIFICANT CHANGE UP (ref 27–34)
MCHC RBC-ENTMCNC: 32.2 % — SIGNIFICANT CHANGE UP (ref 32–36)
MCV RBC AUTO: 86.4 FL — SIGNIFICANT CHANGE UP (ref 80–100)
MONOCYTES # BLD AUTO: 0.98 K/UL — HIGH (ref 0–0.9)
MONOCYTES NFR BLD AUTO: 13 % — SIGNIFICANT CHANGE UP (ref 2–14)
MUCOUS THREADS # UR AUTO: SIGNIFICANT CHANGE UP
NEUTROPHILS # BLD AUTO: 4.04 K/UL — SIGNIFICANT CHANGE UP (ref 1.8–7.4)
NEUTROPHILS NFR BLD AUTO: 53.8 % — SIGNIFICANT CHANGE UP (ref 43–77)
NITRITE UR-MCNC: NEGATIVE — SIGNIFICANT CHANGE UP
NRBC # FLD: 0 — SIGNIFICANT CHANGE UP
PH UR: 5.5 — SIGNIFICANT CHANGE UP (ref 4.6–8)
PHOSPHATE SERPL-MCNC: 2.5 MG/DL — SIGNIFICANT CHANGE UP (ref 2.5–4.5)
PHOSPHATE SERPL-MCNC: 2.6 MG/DL — SIGNIFICANT CHANGE UP (ref 2.5–4.5)
PHOSPHATE SERPL-MCNC: 3.2 MG/DL — SIGNIFICANT CHANGE UP (ref 2.5–4.5)
PLATELET # BLD AUTO: 196 K/UL — SIGNIFICANT CHANGE UP (ref 150–400)
PMV BLD: 11.9 FL — SIGNIFICANT CHANGE UP (ref 7–13)
POTASSIUM SERPL-MCNC: 4.1 MMOL/L — SIGNIFICANT CHANGE UP (ref 3.5–5.3)
POTASSIUM SERPL-MCNC: 4.1 MMOL/L — SIGNIFICANT CHANGE UP (ref 3.5–5.3)
POTASSIUM SERPL-MCNC: 5 MMOL/L — SIGNIFICANT CHANGE UP (ref 3.5–5.3)
POTASSIUM SERPL-SCNC: 4.1 MMOL/L — SIGNIFICANT CHANGE UP (ref 3.5–5.3)
POTASSIUM SERPL-SCNC: 4.1 MMOL/L — SIGNIFICANT CHANGE UP (ref 3.5–5.3)
POTASSIUM SERPL-SCNC: 5 MMOL/L — SIGNIFICANT CHANGE UP (ref 3.5–5.3)
PROT UR-MCNC: 10 MG/DL — SIGNIFICANT CHANGE UP
RBC # BLD: 5.14 M/UL — SIGNIFICANT CHANGE UP (ref 4.2–5.8)
RBC # FLD: 13.2 % — SIGNIFICANT CHANGE UP (ref 10.3–14.5)
RBC CASTS # UR COMP ASSIST: SIGNIFICANT CHANGE UP (ref 0–?)
SODIUM SERPL-SCNC: 134 MMOL/L — LOW (ref 135–145)
SODIUM SERPL-SCNC: 136 MMOL/L — SIGNIFICANT CHANGE UP (ref 135–145)
SODIUM SERPL-SCNC: 137 MMOL/L — SIGNIFICANT CHANGE UP (ref 135–145)
SP GR SPEC: 1.03 — SIGNIFICANT CHANGE UP (ref 1–1.04)
TRIGL SERPL-MCNC: 173 MG/DL — HIGH (ref 10–149)
UROBILINOGEN FLD QL: NORMAL MG/DL — SIGNIFICANT CHANGE UP
WBC # BLD: 7.51 K/UL — SIGNIFICANT CHANGE UP (ref 3.8–10.5)
WBC # FLD AUTO: 7.51 K/UL — SIGNIFICANT CHANGE UP (ref 3.8–10.5)
WBC UR QL: SIGNIFICANT CHANGE UP (ref 0–?)

## 2018-03-23 PROCEDURE — 12345: CPT | Mod: NC

## 2018-03-23 PROCEDURE — 99223 1ST HOSP IP/OBS HIGH 75: CPT | Mod: GC

## 2018-03-23 PROCEDURE — 93010 ELECTROCARDIOGRAM REPORT: CPT

## 2018-03-23 PROCEDURE — 99222 1ST HOSP IP/OBS MODERATE 55: CPT | Mod: GC

## 2018-03-23 PROCEDURE — 99233 SBSQ HOSP IP/OBS HIGH 50: CPT | Mod: GC

## 2018-03-23 RX ORDER — INSULIN LISPRO 100/ML
VIAL (ML) SUBCUTANEOUS
Qty: 0 | Refills: 0 | Status: DISCONTINUED | OUTPATIENT
Start: 2018-03-23 | End: 2018-03-24

## 2018-03-23 RX ORDER — ATORVASTATIN CALCIUM 80 MG/1
40 TABLET, FILM COATED ORAL AT BEDTIME
Qty: 0 | Refills: 0 | Status: DISCONTINUED | OUTPATIENT
Start: 2018-03-23 | End: 2018-03-24

## 2018-03-23 RX ORDER — INSULIN LISPRO 100/ML
6 VIAL (ML) SUBCUTANEOUS
Qty: 0 | Refills: 0 | Status: DISCONTINUED | OUTPATIENT
Start: 2018-03-23 | End: 2018-03-24

## 2018-03-23 RX ORDER — INSULIN GLARGINE 100 [IU]/ML
16 INJECTION, SOLUTION SUBCUTANEOUS AT BEDTIME
Qty: 0 | Refills: 0 | Status: DISCONTINUED | OUTPATIENT
Start: 2018-03-23 | End: 2018-03-24

## 2018-03-23 RX ORDER — INSULIN LISPRO 100/ML
2 VIAL (ML) SUBCUTANEOUS ONCE
Qty: 0 | Refills: 0 | Status: COMPLETED | OUTPATIENT
Start: 2018-03-23 | End: 2018-03-23

## 2018-03-23 RX ORDER — AMLODIPINE BESYLATE 2.5 MG/1
5 TABLET ORAL DAILY
Qty: 0 | Refills: 0 | Status: DISCONTINUED | OUTPATIENT
Start: 2018-03-23 | End: 2018-03-24

## 2018-03-23 RX ORDER — SODIUM CHLORIDE 9 MG/ML
1000 INJECTION INTRAMUSCULAR; INTRAVENOUS; SUBCUTANEOUS ONCE
Qty: 0 | Refills: 0 | Status: DISCONTINUED | OUTPATIENT
Start: 2018-03-23 | End: 2018-03-23

## 2018-03-23 RX ORDER — DEXTROSE 50 % IN WATER 50 %
1 SYRINGE (ML) INTRAVENOUS ONCE
Qty: 0 | Refills: 0 | Status: DISCONTINUED | OUTPATIENT
Start: 2018-03-23 | End: 2018-03-24

## 2018-03-23 RX ORDER — SODIUM CHLORIDE 9 MG/ML
1000 INJECTION, SOLUTION INTRAVENOUS
Qty: 0 | Refills: 0 | Status: DISCONTINUED | OUTPATIENT
Start: 2018-03-23 | End: 2018-03-24

## 2018-03-23 RX ORDER — DEXTROSE 50 % IN WATER 50 %
25 SYRINGE (ML) INTRAVENOUS ONCE
Qty: 0 | Refills: 0 | Status: DISCONTINUED | OUTPATIENT
Start: 2018-03-23 | End: 2018-03-24

## 2018-03-23 RX ORDER — INSULIN LISPRO 100/ML
5 VIAL (ML) SUBCUTANEOUS
Qty: 0 | Refills: 0 | Status: DISCONTINUED | OUTPATIENT
Start: 2018-03-23 | End: 2018-03-23

## 2018-03-23 RX ORDER — INSULIN GLARGINE 100 [IU]/ML
12 INJECTION, SOLUTION SUBCUTANEOUS AT BEDTIME
Qty: 0 | Refills: 0 | Status: DISCONTINUED | OUTPATIENT
Start: 2018-03-23 | End: 2018-03-23

## 2018-03-23 RX ORDER — SODIUM CHLORIDE 9 MG/ML
1000 INJECTION INTRAMUSCULAR; INTRAVENOUS; SUBCUTANEOUS
Qty: 0 | Refills: 0 | Status: DISCONTINUED | OUTPATIENT
Start: 2018-03-23 | End: 2018-03-23

## 2018-03-23 RX ORDER — AMLODIPINE BESYLATE 2.5 MG/1
5 TABLET ORAL DAILY
Qty: 0 | Refills: 0 | Status: DISCONTINUED | OUTPATIENT
Start: 2018-03-23 | End: 2018-03-23

## 2018-03-23 RX ORDER — SODIUM CHLORIDE 9 MG/ML
1000 INJECTION INTRAMUSCULAR; INTRAVENOUS; SUBCUTANEOUS
Qty: 0 | Refills: 0 | Status: DISCONTINUED | OUTPATIENT
Start: 2018-03-23 | End: 2018-03-24

## 2018-03-23 RX ORDER — INSULIN LISPRO 100/ML
VIAL (ML) SUBCUTANEOUS AT BEDTIME
Qty: 0 | Refills: 0 | Status: DISCONTINUED | OUTPATIENT
Start: 2018-03-23 | End: 2018-03-24

## 2018-03-23 RX ORDER — SODIUM CHLORIDE 9 MG/ML
1000 INJECTION, SOLUTION INTRAVENOUS
Qty: 0 | Refills: 0 | Status: DISCONTINUED | OUTPATIENT
Start: 2018-03-23 | End: 2018-03-23

## 2018-03-23 RX ORDER — GLUCAGON INJECTION, SOLUTION 0.5 MG/.1ML
1 INJECTION, SOLUTION SUBCUTANEOUS ONCE
Qty: 0 | Refills: 0 | Status: DISCONTINUED | OUTPATIENT
Start: 2018-03-23 | End: 2018-03-24

## 2018-03-23 RX ORDER — INSULIN GLARGINE 100 [IU]/ML
17 INJECTION, SOLUTION SUBCUTANEOUS AT BEDTIME
Qty: 0 | Refills: 0 | Status: DISCONTINUED | OUTPATIENT
Start: 2018-03-23 | End: 2018-03-23

## 2018-03-23 RX ORDER — INSULIN LISPRO 100/ML
VIAL (ML) SUBCUTANEOUS
Qty: 0 | Refills: 0 | Status: DISCONTINUED | OUTPATIENT
Start: 2018-03-23 | End: 2018-03-23

## 2018-03-23 RX ORDER — DEXTROSE 50 % IN WATER 50 %
12.5 SYRINGE (ML) INTRAVENOUS ONCE
Qty: 0 | Refills: 0 | Status: DISCONTINUED | OUTPATIENT
Start: 2018-03-23 | End: 2018-03-24

## 2018-03-23 RX ADMIN — Medication 5 UNIT(S): at 12:58

## 2018-03-23 RX ADMIN — HEPARIN SODIUM 5000 UNIT(S): 5000 INJECTION INTRAVENOUS; SUBCUTANEOUS at 05:30

## 2018-03-23 RX ADMIN — Medication 3: at 09:04

## 2018-03-23 RX ADMIN — AMLODIPINE BESYLATE 5 MILLIGRAM(S): 2.5 TABLET ORAL at 18:28

## 2018-03-23 RX ADMIN — SODIUM CHLORIDE 100 MILLILITER(S): 9 INJECTION INTRAMUSCULAR; INTRAVENOUS; SUBCUTANEOUS at 11:21

## 2018-03-23 RX ADMIN — Medication 6 UNIT(S): at 18:25

## 2018-03-23 RX ADMIN — Medication 1 TABLET(S): at 11:21

## 2018-03-23 RX ADMIN — INSULIN GLARGINE 16 UNIT(S): 100 INJECTION, SOLUTION SUBCUTANEOUS at 22:33

## 2018-03-23 RX ADMIN — SODIUM CHLORIDE 125 MILLILITER(S): 9 INJECTION, SOLUTION INTRAVENOUS at 05:30

## 2018-03-23 RX ADMIN — Medication 2 UNIT(S): at 02:35

## 2018-03-23 RX ADMIN — ATORVASTATIN CALCIUM 40 MILLIGRAM(S): 80 TABLET, FILM COATED ORAL at 21:03

## 2018-03-23 RX ADMIN — Medication 1: at 22:32

## 2018-03-23 RX ADMIN — Medication 2: at 12:58

## 2018-03-23 RX ADMIN — Medication 1: at 18:25

## 2018-03-23 NOTE — PROGRESS NOTE ADULT - PROBLEM SELECTOR PLAN 4
- c/w home Atenolol-Chlorthalidone (100mg-25mg)  - Start ACEI Given long standing diabetes and w/ AIYANA resolved and UA w/ trace proteinuria will start lisinopril 2.5mg.

## 2018-03-23 NOTE — CONSULT NOTE ADULT - SUBJECTIVE AND OBJECTIVE BOX
HPI:  Patient is a 66 y/o M w/ a PMHx of HTN and T2DM who was sent to the ED by his PMD for hyperglycemia. Patient reports that he was in his usual state of health until 2-3 weeks ago when he began to develop vague abdominal complaints. He explains that he started to feel a "shaking" diffusely throughout his abdomen which felt very strange. The sensation was intermittent and he was unsure of any aggravating/relieving factors. No known association with food. He states that he was drinking a lot of juice and taking OTC DayQuil and Nyquil for his symptoms without relief. During this time, patient also reports worsening polyuria/polydipsia as well as fatigue. Since patient's symptoms were not improving, he scheduled an appointment with his PMD who he visited on day of presentation. Patient was found to have a FS > 500 and so he was advised to go to the ED for further evaluation. On ROS, patient endorses dry mouth, but denies any recent fever, chills, headache, dizziness, vision changes, chest pain, palpitations, shortness of breath, cough, vomiting, diarrhea, or dysuria. Of note, patient states that his PMD prescribes him Atenolol-Chlorthalidone (100mg/25mg) for HTN and Glipizide (unknown dose) for T2DM; however, he reports that he has seldom been taking these medications. He reports that he recently visited his family in [facility] in 1/2018.    In the ED, patient's VS were: T = 98.7F, HR = 77, BP = 136/98, RR = 16, O2 Sat = 100% on RA. Labs were significant for hyperglycemia (serum glucose = 627), AIYANA (Cr = 1.81), AG = 30, Beta-Hydroxybutyrate of 5.2, lactate of 4.6, pH of 7.34, and a Hemoglobin A1c of 15.8%. Patient was given 3L NS, IV Regular Insulin 7U x1, Lantus 12U x1, PO KCl 40 mEq x1, and was started on LR @ 125cc/hr. Patient's serum glucose improved to 342, AG improved to 23, and lactate improved to 3.0. He was then admitted to Medicine for further management. (22 Mar 2018 23:47)      Endocrine history: 67 yr M with T2Dm longstanding here with polydipsia and polyuria. patien jamie to have Glucose of 564 AG 20 BOHB 5.2 -foudn to be in DKA. Patient states he has never been on fixed medication for diabetes. He was precribed [id number] 5 mg daily which he would take her and there when he thought his glucose was hgih. Patient does not check his glucose at home. This is the first time he has been hospitalized for uncnortllled DM. His diet is otherwose moderate in bread and rice but high in plaintains and fruit juice. No known micro or macro vascaulr compllcaitins fo Dm.     PAST MEDICAL & SURGICAL HISTORY:  Diabetes mellitus  Hypertension  No significant past surgical history      FAMILY HISTORY:  No pertinent family history in first degree relatives      Social History:nonsmoker ,nondrinker    Outpatient Medications: glipizide (noncompliant), atenolol-chlorthalidone    MEDICATIONS  (STANDING):  dextrose 5%. 1000 milliLiter(s) (50 mL/Hr) IV Continuous <Continuous>  dextrose 50% Injectable 12.5 Gram(s) IV Push once  dextrose 50% Injectable 25 Gram(s) IV Push once  dextrose 50% Injectable 25 Gram(s) IV Push once  insulin glargine Injectable (LANTUS) 17 Unit(s) SubCutaneous at bedtime  insulin lispro (HumaLOG) corrective regimen sliding scale   SubCutaneous three times a day before meals  insulin lispro (HumaLOG) corrective regimen sliding scale   SubCutaneous at bedtime  insulin lispro Injectable (HumaLOG) 5 Unit(s) SubCutaneous three times a day before meals  multivitamin 1 Tablet(s) Oral daily  sodium chloride 0.9%. 1000 milliLiter(s) (100 mL/Hr) IV Continuous <Continuous>    MEDICATIONS  (PRN):  dextrose Gel 1 Dose(s) Oral once PRN Blood Glucose LESS THAN 70 milliGRAM(s)/deciliter  glucagon  Injectable 1 milliGRAM(s) IntraMuscular once PRN Glucose LESS THAN 70 milligrams/deciliter      Allergies    No Known Allergies    Intolerances      Review of Systems:  Constitutional: No fever  Eyes: No blurry vision  Neuro: No tremors  HEENT: No pain  Cardiovascular: No chest pain, palpitations  Respiratory: No SOB, no cough  GI: No nausea, vomiting, abdominal pain  : No dysuria  Skin: no rash  Psych: no depression  Endocrine: + polyuria, polydipsia  Hem/lymph: no swelling  Osteoporosis: no fractures    ALL OTHER SYSTEMS REVIEWED AND NEGATIVE        PHYSICAL EXAM:  VITALS: T(C): 36.9 (03-23-18 @ 14:49)  T(F): 98.4 (03-23-18 @ 14:49), Max: 98.9 (03-23-18 @ 05:27)  HR: 86 (03-23-18 @ 14:49) (70 - 86)  BP: 145/94 (03-23-18 @ 14:49) (128/88 - 145/94)  RR:  (16 - 18)  SpO2:  (98% - 100%)  Wt(kg): --  GENERAL: NAD, well-groomed, well-developed  EYES: No proptosis, no lid lag, anicteric  HEENT:  Atraumatic, Normocephalic, moist mucous membranes  THYROID: Normal size, no palpable nodules  RESPIRATORY: Clear to auscultation bilaterally; No rales, rhonchi, wheezing, or rubs  CARDIOVASCULAR: Regular rate and rhythm; No murmurs; no peripheral edema  GI: Soft, nontender, non distended, normal bowel sounds  SKIN: Dry, intact, No rashes or lesions  MUSCULOSKELETAL: Full range of motion, normal strength  NEURO: sensation intact, extraocular movements intact, no tremor, normal reflexes  PSYCH: Alert and oriented x 3, normal affect, normal mood      POCT Blood Glucose.: 224 mg/dL (03-23-18 @ 12:18)  POCT Blood Glucose.: 298 mg/dL (03-23-18 @ 08:45)  POCT Blood Glucose.: 296 mg/dL (03-23-18 @ 02:09)  POCT Blood Glucose.: 300 mg/dL (03-22-18 @ 23:58)  POCT Blood Glucose.: 300 mg/dL (03-22-18 @ 21:26)  POCT Blood Glucose.: 305 mg/dL (03-22-18 @ 19:59)  POCT Blood Glucose.: 350 mg/dL (03-22-18 @ 17:47)  POCT Blood Glucose.: 466 mg/dL (03-22-18 @ 16:21)  POCT Blood Glucose.: 564 mg/dL (03-22-18 @ 14:02)                            14.3   7.51  )-----------( 196      ( 23 Mar 2018 05:16 )             44.4       03-23    137  |  95<L>  |  21  ----------------------------<  262<H>  4.1   |  21<L>  |  1.16    EGFR if : 74  EGFR if non : 64    Ca    8.7      03-23  Mg     2.3     03-23  Phos  2.6     03-23    TPro  9.0<H>  /  Alb  4.5  /  TBili  0.6  /  DBili  x   /  AST  30  /  ALT  39  /  AlkPhos  90  03-22        Hemoglobin A1C, Whole Blood: 15.8 % <H> [4.0 - 5.6] (03-22-18 @ 14:30)      03-23 Chol 227<H>  HDL 40 Trig 173<H> HPI:  Patient is a 66 y/o M w/ a PMHx of HTN and T2DM who was sent to the ED by his PMD for hyperglycemia. Patient reports that he was in his usual state of health until 2-3 weeks ago when he began to develop vague abdominal complaints. He explains that he started to feel a "shaking" diffusely throughout his abdomen which felt very strange. The sensation was intermittent and he was unsure of any aggravating/relieving factors. No known association with food. He states that he was drinking a lot of juice and taking OTC DayQuil and Nyquil for his symptoms without relief. During this time, patient also reports worsening polyuria/polydipsia as well as fatigue. Since patient's symptoms were not improving, he scheduled an appointment with his PMD who he visited on day of presentation. Patient was found to have a FS > 500 and so he was advised to go to the ED for further evaluation. On ROS, patient endorses dry mouth, but denies any recent fever, chills, headache, dizziness, vision changes, chest pain, palpitations, shortness of breath, cough, vomiting, diarrhea, or dysuria. Of note, patient states that his PMD prescribes him Atenolol-Chlorthalidone (100mg/25mg) for HTN and Glipizide (unknown dose) for T2DM; however, he reports that he has seldom been taking these medications. He reports that he recently visited his family in [facility] in 1/2018.    In the ED, patient's VS were: T = 98.7F, HR = 77, BP = 136/98, RR = 16, O2 Sat = 100% on RA. Labs were significant for hyperglycemia (serum glucose = 627), AIYANA (Cr = 1.81), AG = 30, Beta-Hydroxybutyrate of 5.2, lactate of 4.6, pH of 7.34, and a Hemoglobin A1c of 15.8%. Patient was given 3L NS, IV Regular Insulin 7U x1, Lantus 12U x1, PO KCl 40 mEq x1, and was started on LR @ 125cc/hr. Patient's serum glucose improved to 342, AG improved to 23, and lactate improved to 3.0. He was then admitted to Medicine for further management. (22 Mar 2018 23:47)      Endocrine history: 67 yr M with T2DM longstanding here with polydipsia and polyuria. Patient found to have Glucose of 564 AG 20 BOHB 5.2 found to be in DKA. Patient states he has never been on fixed medication for diabetes. He was precribed glipizide 5 mg daily which he would take here and there when he thought his glucose was high. Patient does not check his glucose at home. This is the first time he has been hospitalized for uncontrolled DM. His diet is otherwise moderate in bread and rice but high in plantains and fruit juice. No known micro or macrovascular complications of DM.     PAST MEDICAL & SURGICAL HISTORY:  Diabetes mellitus  Hypertension  No significant past surgical history      FAMILY HISTORY:  No pertinent family history in first degree relatives      Social History:nonsmoker ,nondrinker    Outpatient Medications: glipizide (noncompliant), atenolol-chlorthalidone    MEDICATIONS  (STANDING):  dextrose 5%. 1000 milliLiter(s) (50 mL/Hr) IV Continuous <Continuous>  dextrose 50% Injectable 12.5 Gram(s) IV Push once  dextrose 50% Injectable 25 Gram(s) IV Push once  dextrose 50% Injectable 25 Gram(s) IV Push once  insulin glargine Injectable (LANTUS) 17 Unit(s) SubCutaneous at bedtime  insulin lispro (HumaLOG) corrective regimen sliding scale   SubCutaneous three times a day before meals  insulin lispro (HumaLOG) corrective regimen sliding scale   SubCutaneous at bedtime  insulin lispro Injectable (HumaLOG) 5 Unit(s) SubCutaneous three times a day before meals  multivitamin 1 Tablet(s) Oral daily  sodium chloride 0.9%. 1000 milliLiter(s) (100 mL/Hr) IV Continuous <Continuous>    MEDICATIONS  (PRN):  dextrose Gel 1 Dose(s) Oral once PRN Blood Glucose LESS THAN 70 milliGRAM(s)/deciliter  glucagon  Injectable 1 milliGRAM(s) IntraMuscular once PRN Glucose LESS THAN 70 milligrams/deciliter      Allergies    No Known Allergies    Intolerances      Review of Systems:  Constitutional: No fever  Eyes: No blurry vision  Neuro: No tremors  HEENT: No pain  Cardiovascular: No chest pain, palpitations  Respiratory: No SOB, no cough  GI: No nausea, vomiting, abdominal pain  : No dysuria  Skin: no rash  Psych: no depression  Endocrine: + polyuria, polydipsia  Hem/lymph: no swelling  Osteoporosis: no fractures    ALL OTHER SYSTEMS REVIEWED AND NEGATIVE        PHYSICAL EXAM:  VITALS: T(C): 36.9 (03-23-18 @ 14:49)  T(F): 98.4 (03-23-18 @ 14:49), Max: 98.9 (03-23-18 @ 05:27)  HR: 86 (03-23-18 @ 14:49) (70 - 86)  BP: 145/94 (03-23-18 @ 14:49) (128/88 - 145/94)  RR:  (16 - 18)  SpO2:  (98% - 100%)  Wt(kg): --  GENERAL: NAD, well-groomed, well-developed  EYES: No proptosis, no lid lag, anicteric  HEENT:  Atraumatic, Normocephalic, moist mucous membranes  THYROID: Normal size, no palpable nodules  RESPIRATORY: Clear to auscultation bilaterally; No rales, rhonchi, wheezing, or rubs  CARDIOVASCULAR: Regular rate and rhythm; No murmurs; no peripheral edema  GI: Soft, nontender, non distended, normal bowel sounds  SKIN: Dry, intact, No rashes or lesions  MUSCULOSKELETAL: Full range of motion, normal strength  NEURO: sensation intact, extraocular movements intact, no tremor, normal reflexes  PSYCH: Alert and oriented x 3, normal affect, normal mood      POCT Blood Glucose.: 224 mg/dL (03-23-18 @ 12:18)  POCT Blood Glucose.: 298 mg/dL (03-23-18 @ 08:45)  POCT Blood Glucose.: 296 mg/dL (03-23-18 @ 02:09)  POCT Blood Glucose.: 300 mg/dL (03-22-18 @ 23:58)  POCT Blood Glucose.: 300 mg/dL (03-22-18 @ 21:26)  POCT Blood Glucose.: 305 mg/dL (03-22-18 @ 19:59)  POCT Blood Glucose.: 350 mg/dL (03-22-18 @ 17:47)  POCT Blood Glucose.: 466 mg/dL (03-22-18 @ 16:21)  POCT Blood Glucose.: 564 mg/dL (03-22-18 @ 14:02)                            14.3   7.51  )-----------( 196      ( 23 Mar 2018 05:16 )             44.4       03-23    137  |  95<L>  |  21  ----------------------------<  262<H>  4.1   |  21<L>  |  1.16    EGFR if : 74  EGFR if non : 64    Ca    8.7      03-23  Mg     2.3     03-23  Phos  2.6     03-23    TPro  9.0<H>  /  Alb  4.5  /  TBili  0.6  /  DBili  x   /  AST  30  /  ALT  39  /  AlkPhos  90  03-22        Hemoglobin A1C, Whole Blood: 15.8 % <H> [4.0 - 5.6] (03-22-18 @ 14:30)      03-23 Chol 227<H>  HDL 40 Trig 173<H> HPI:  Patient is a 68 y/o M w/ a PMHx of HTN and T2DM who was sent to the ED by his PMD for hyperglycemia. Patient reports that he was in his usual state of health until 2-3 weeks ago when he began to develop vague abdominal complaints. He explains that he started to feel a "shaking" diffusely throughout his abdomen which felt very strange. The sensation was intermittent and he was unsure of any aggravating/relieving factors. No known association with food. He states that he was drinking a lot of juice and taking OTC DayQuil and Nyquil for his symptoms without relief. During this time, patient also reports worsening polyuria/polydipsia as well as fatigue. Since patient's symptoms were not improving, he scheduled an appointment with his PMD who he visited on day of presentation. Patient was found to have a FS > 500 and so he was advised to go to the ED for further evaluation. On ROS, patient endorses dry mouth, but denies any recent fever, chills, headache, dizziness, vision changes, chest pain, palpitations, shortness of breath, cough, vomiting, diarrhea, or dysuria. Of note, patient states that his PMD prescribes him Atenolol-Chlorthalidone (100mg/25mg) for HTN and Glipizide (unknown dose) for T2DM; however, he reports that he has seldom been taking these medications. He reports that he recently visited his family in [facility] in 1/2018.    In the ED, patient's VS were: T = 98.7F, HR = 77, BP = 136/98, RR = 16, O2 Sat = 100% on RA. Labs were significant for hyperglycemia (serum glucose = 627), AIYANA (Cr = 1.81), AG = 30, Beta-Hydroxybutyrate of 5.2, lactate of 4.6, pH of 7.34, and a Hemoglobin A1c of 15.8%. Patient was given 3L NS, IV Regular Insulin 7U x1, Lantus 12U x1, PO KCl 40 mEq x1, and was started on LR @ 125cc/hr. Patient's serum glucose improved to 342, AG improved to 23, and lactate improved to 3.0. He was then admitted to Medicine for further management. (22 Mar 2018 23:47)      Endocrine history: 67 yr M with T2DM longstanding here with polydipsia and polyuria. Patient found to have Glucose of 564 AG 20 BOHB 5.2 found to be in DKA. Patient states he has never been on fixed medication for diabetes. He was precribed glipizide 5 mg daily which he would take here and there when he thought his glucose was high. Patient does not check his glucose at home. This is the first time he has been hospitalized for uncontrolled DM. His diet is otherwise moderate in bread and rice but high in plantains and fruit juice. No known micro or macrovascular complications of DM.     PAST MEDICAL & SURGICAL HISTORY:  Diabetes mellitus  Hypertension  No significant past surgical history      FAMILY HISTORY:  No pertinent family history in first degree relatives      Social History:nonsmoker ,nondrinker    Outpatient Medications: glipizide (noncompliant), atenolol-chlorthalidone    MEDICATIONS  (STANDING):  dextrose 5%. 1000 milliLiter(s) (50 mL/Hr) IV Continuous <Continuous>  dextrose 50% Injectable 12.5 Gram(s) IV Push once  dextrose 50% Injectable 25 Gram(s) IV Push once  dextrose 50% Injectable 25 Gram(s) IV Push once  insulin glargine Injectable (LANTUS) 17 Unit(s) SubCutaneous at bedtime  insulin lispro (HumaLOG) corrective regimen sliding scale   SubCutaneous three times a day before meals  insulin lispro (HumaLOG) corrective regimen sliding scale   SubCutaneous at bedtime  insulin lispro Injectable (HumaLOG) 5 Unit(s) SubCutaneous three times a day before meals  multivitamin 1 Tablet(s) Oral daily  sodium chloride 0.9%. 1000 milliLiter(s) (100 mL/Hr) IV Continuous <Continuous>    MEDICATIONS  (PRN):  dextrose Gel 1 Dose(s) Oral once PRN Blood Glucose LESS THAN 70 milliGRAM(s)/deciliter  glucagon  Injectable 1 milliGRAM(s) IntraMuscular once PRN Glucose LESS THAN 70 milligrams/deciliter      Allergies    No Known Allergies    Intolerances      Review of Systems:  Constitutional: No fever  Eyes: No blurry vision  Neuro: No tremors  HEENT: No pain  Cardiovascular: No chest pain, palpitations  Respiratory: No SOB, no cough  GI: No nausea, vomiting, abdominal pain  : No dysuria  Skin: no rash  Psych: no depression  Endocrine: + polyuria, polydipsia  Hem/lymph: no swelling  Osteoporosis: no fractures    ALL OTHER SYSTEMS REVIEWED AND NEGATIVE        PHYSICAL EXAM:  VITALS: T(C): 36.9 (03-23-18 @ 14:49)  T(F): 98.4 (03-23-18 @ 14:49), Max: 98.9 (03-23-18 @ 05:27)  HR: 86 (03-23-18 @ 14:49) (70 - 86)  BP: 145/94 (03-23-18 @ 14:49) (128/88 - 145/94)  RR:  (16 - 18)  SpO2:  (98% - 100%)  Wt(kg): --  GENERAL: NAD, well-groomed, well-developed  EYES: No proptosis, no lid lag, anicteric  HEENT:  Atraumatic, Normocephalic, moist mucous membranes  RESPIRATORY: Clear to auscultation bilaterally; No rales, rhonchi, wheezing, or rubs  CARDIOVASCULAR: Regular rate and rhythm; No murmurs; no peripheral edema  GI: Soft, nontender, non distended, normal bowel sounds  SKIN: Dry, intact, No rashes or lesions  MUSCULOSKELETAL: Full range of motion, normal strength  NEURO: sensation intact, extraocular movements intact, no tremor, normal reflexes  PSYCH: Alert and oriented x 3, normal affect, normal mood      POCT Blood Glucose.: 224 mg/dL (03-23-18 @ 12:18)  POCT Blood Glucose.: 298 mg/dL (03-23-18 @ 08:45)  POCT Blood Glucose.: 296 mg/dL (03-23-18 @ 02:09)  POCT Blood Glucose.: 300 mg/dL (03-22-18 @ 23:58)  POCT Blood Glucose.: 300 mg/dL (03-22-18 @ 21:26)  POCT Blood Glucose.: 305 mg/dL (03-22-18 @ 19:59)  POCT Blood Glucose.: 350 mg/dL (03-22-18 @ 17:47)  POCT Blood Glucose.: 466 mg/dL (03-22-18 @ 16:21)  POCT Blood Glucose.: 564 mg/dL (03-22-18 @ 14:02)                            14.3   7.51  )-----------( 196      ( 23 Mar 2018 05:16 )             44.4       03-23    137  |  95<L>  |  21  ----------------------------<  262<H>  4.1   |  21<L>  |  1.16    EGFR if : 74  EGFR if non : 64    Ca    8.7      03-23  Mg     2.3     03-23  Phos  2.6     03-23    TPro  9.0<H>  /  Alb  4.5  /  TBili  0.6  /  DBili  x   /  AST  30  /  ALT  39  /  AlkPhos  90  03-22        Hemoglobin A1C, Whole Blood: 15.8 % <H> [4.0 - 5.6] (03-22-18 @ 14:30)      03-23 Chol 227<H>  HDL 40 Trig 173<H>

## 2018-03-23 NOTE — PROGRESS NOTE ADULT - PROBLEM SELECTOR PLAN 6
DVT PPx: HSQ  Diet: Diabetic diet  Dispo: pending resolution of DKA DVT PPx: Low risk per IMPROVE score  Diet: Diabetic diet  Dispo: pending resolution of DKA, Diabetes education

## 2018-03-23 NOTE — CONSULT NOTE ADULT - ASSESSMENT
67 yr M with DM2 ucnotrolled here with no known complications here with DKA and foudn to have A1C 15.8 67 yr M with DM2 uncontrolled here with no known complications here with DKA and found to have A1C 15.8

## 2018-03-23 NOTE — PROGRESS NOTE ADULT - PROBLEM SELECTOR PLAN 2
- AGMA 2/2 AIYANA, elevated lactate, and DKA.  - Improving w/ IVF and Insulin. C/w diabetes management as noted above - AGMA 2/2 AIYANA, elevated lactate, and mild DKA.  - Improving w/ IVF and Insulin. C/w diabetes management as noted above

## 2018-03-23 NOTE — CONSULT NOTE ADULT - PROBLEM SELECTOR RECOMMENDATION 9
-While in hospital would increase Lantus to 17U and increase humalog to 6U before meals with low dose humalog correctional scale before meals and at bedtime  -Goal glucose 100-180  -Nutrition consult  -Diabetes education  -He will need diabetic supplies on discahrge  -He can f/u at [address] [place] [address] [phone] -While in hospital would increase Lantus to 16U and increase humalog to 6U before meals with low dose humalog correctional scale before meals and at bedtime  -Goal glucose 100-180  -Nutrition consult  -Diabetes education  -He will need diabetic supplies on discharge  -He can f/u at [address] [place] [address] [phone]

## 2018-03-23 NOTE — PROGRESS NOTE ADULT - PROBLEM SELECTOR PLAN 1
- d/t med non compliance. On home Glipizide.  - UA w/ large amounts of glucose and ketones; no proteinuria  - Initial gap of 38. Closing s/p IV Regular Insulin 7U x1 and Lantus 12U x1, ISS, and IVF.  - c/w Lantus 12U QHS, HISS.  - BMP q8h, replete PRN  - Endocrine consult - d/t med non compliance. On home Glipizide.  - UA w/ large amounts of glucose and ketones; no proteinuria, no signs of infection  - Initial gap of 38. Closing s/p IV Regular Insulin 7U x1 and Lantus 12U x1, ISS, and IVF.  - c/w Lantus 12U QHS, HISS.  - BMP q8h, replete PRN  - Endocrine consult - d/t med non compliance. On home Glipizide.  - UA w/ large amounts of glucose and ketones; no proteinuria, no signs of infection  - Initial gap of 38. Closing s/p IV Regular Insulin 7U x1 and Lantus 12U x1, ISS, and IVF.  - Increased Lantus to 17U QHS, Humalog premeal 5u. ISS  - BMP q8h, replete PRN  - Endocrine consult

## 2018-03-23 NOTE — PROGRESS NOTE ADULT - PROBLEM SELECTOR PLAN 5
- DVT PPx: HSQ Elevated cholesterol and triglycerides  - start statin Elevated cholesterol and triglycerides  - start atorvastatin

## 2018-03-23 NOTE — CONSULT NOTE ADULT - ATTENDING COMMENTS
Agree with above plan. CDE saw pt today and provided insulin teaching.  Pt feels comfortable with injections now and should be d/c on insulin pens (lantus or basaglar at doses required here, also novolog or humalog at doses required here).  Will also need script for BD sherlyn pen needles on d/c and to make sure he has glucometer, test strips, and lancets. Agree with above plan. CDE saw pt today and provided insulin teaching.  Pt feels comfortable with injections now and should be d/c on insulin pens (lantus or basaglar at doses required here, also novolog or humalog at doses required here).  Will also need script for BD sherlyn pen needles on d/c and to make sure he has glucometer, test strips, and lancets.  Anion gap is 19 so would continue to monitor this until it closes (bicarb is at LLN).

## 2018-03-23 NOTE — PROGRESS NOTE ADULT - SUBJECTIVE AND OBJECTIVE BOX
Booker Alfaro MD, Internal Medicine, PGY1  Pager - [facility]: [phone] ; [facility]: [phone]    MEDICINE ACCEPT NOTE    CC: Patient is a 67y old  Male who presents with a chief complaint of Hyperglycemia (22 Mar 2018 23:47)      HPI: Patient is a 66 y/o M w/ a PMHx of HTN and T2DM who was sent to the ED by his PMD for hyperglycemia. Patient reports that he was in his usual state of health until 2-3 weeks ago when he began to develop vague abdominal complaints. He explains that he started to feel a "shaking" diffusely throughout his abdomen which felt very strange. The sensation was intermittent and he was unsure of any aggravating/relieving factors. No known association with food. He states that he was drinking a lot of juice and taking OTC DayQuil and Nyquil for his symptoms without relief. During this time, patient also reports worsening polyuria/polydipsia as well as fatigue. Since patient's symptoms were not improving, he scheduled an appointment with his PMD who he visited on day of presentation. Patient was found to have a FS > 500 and so he was advised to go to the ED for further evaluation. On ROS, patient endorses dry mouth, but denies any recent fever, chills, headache, dizziness, vision changes, chest pain, palpitations, shortness of breath, cough, vomiting, diarrhea, or dysuria. Of note, patient states that his PMD prescribes him Atenolol-Chlorthalidone (100mg/25mg) for HTN and Glipizide (unknown dose) for T2DM; however, he reports that he has seldom been taking these medications. He reports that he recently visited his family in [facility] in 1/2018.    In the ED, patient's VS were: T = 98.7F, HR = 77, BP = 136/98, RR = 16, O2 Sat = 100% on RA. Labs were significant for hyperglycemia (serum glucose = 627), AIYANA (Cr = 1.81), AG = 38, Beta-Hydroxybutyrate of 5.2, lactate of 4.6, pH of 7.34, and a Hemoglobin A1c of 15.8%. Patient was given 3L NS, IV Regular Insulin 7U x1, Lantus 12U x1, PO KCl 40 mEq x1, and was started on LR @ 125cc/hr. Patient's serum glucose improved to 342, AG improved to 27, and lactate improved to 3.0. He was then admitted to Medicine for further management.    This AM, Gap improved to 24 and patient w/o complaints. Patient states that he was diagnosed with T2DM appx 20 years ago but has been non-compliant with his medications during this entire time span. Pt states his prior PMD visit before being seen on day of presentation was about 3 years ago and currently does not take any diabetic medications.    REVIEW OF SYSTEMS:    CONSTITUTIONAL: +chills; no weakness, fatigue, malaise, fevers no weight change, appetite change  EYES: No visual changes; No double vision  Ears: no otalgia, no otorrhea, no hearing loss  Nose: no epistaxis, rhinorrhea  Throat: no throat pain, no oral lesions  NECK: No pain or stiffness  RESPIRATORY: No cough (productive or dry), wheezing, hemoptysis; No shortness of breath  CARDIOVASCULAR: No chest pain or palpitations, no leg edema, no claudication  GASTROINTESTINAL: +abdominal discomfort. No nausea, vomiting, or hematemesis; No diarrhea or constipation. No melena or hematochezia.  GENITOURINARY: No dysuria, frequency, urgency or hematuria, no pelvic pain, urinary incontinence, +polyuria  Muscloskeletal: no joints or muscle pain, no swelling in joints or muscles  NEUROLOGICAL: No numbness or weakness, headache, dizziness  SKIN: No pruritis, rashes, lesions or new moles, no wounds  Endocrine: No Heat or Cold intolerance, +polydipsia  Heme/Lymph: no LN enlargement, no easy bruising or bleeding       Allergies    No Known Allergies    Intolerances    	    PAST MEDICAL & SURGICAL HISTORY:  Diabetes mellitus  Hypertension  No significant past surgical history      FAMILY HISTORY:  No pertinent family history in first degree relatives      SOCIAL HISTORY:  OCCUPATION:  TOBACCO USE:  ALCOHOL USE:  RECREATIONAL DRUG USE:  HIGH RISK SEXUAL ACTIVITY:    MEDICATIONS:  heparin  Injectable 5000 Unit(s) SubCutaneous every 8 hours            dextrose 50% Injectable 12.5 Gram(s) IV Push once  dextrose 50% Injectable 25 Gram(s) IV Push once  dextrose 50% Injectable 25 Gram(s) IV Push once  dextrose Gel 1 Dose(s) Oral once PRN  glucagon  Injectable 1 milliGRAM(s) IntraMuscular once PRN  insulin glargine Injectable (LANTUS) 12 Unit(s) SubCutaneous at bedtime  insulin lispro (HumaLOG) corrective regimen sliding scale   SubCutaneous three times a day before meals  insulin lispro (HumaLOG) corrective regimen sliding scale   SubCutaneous at bedtime    dextrose 5%. 1000 milliLiter(s) IV Continuous <Continuous>  multivitamin 1 Tablet(s) Oral daily      PHYSICAL EXAM:  T(C): 37.2 (03-23-18 @ 05:27), Max: 37.2 (03-23-18 @ 05:27)  HR: 70 (03-23-18 @ 05:27) (70 - 77)  BP: 142/89 (03-23-18 @ 05:27) (128/88 - 161/99)  RR: 16 (03-23-18 @ 05:27) (16 - 20)  SpO2: 100% (03-23-18 @ 05:27) (100% - 100%)  Wt(kg): --  Daily Height in cm: 170.18 (22 Mar 2018 22:47)    Daily   I&O's Summary    23 Mar 2018 07:01  -  23 Mar 2018 09:15  --------------------------------------------------------  IN: 0 mL / OUT: 300 mL / NET: -300 mL      TELEMETRY:     PHYSICAL EXAM  GENERAL: NAD, well-developed  HEAD: NC/AT  Eyes: EOMI, PERRLA, conjunctiva and sclera clear  NECK: Supple, No JVD  CHEST/LUNG: CTAB, No wheeze  HEART: RRR, no m/r/g  ABDOMEN: Soft, NT/ND, +BS  EXTREMITIES: 2+ peripheral pulses, no clubbing, cyanosis, edema, no wounds on extremities b/l  NEURO: AO3, No focal deficits, CN II-XII intact, tactile and pain sensations intact in LE b/l.  PSYCH: Appropriate mood and affect  SKIN: No rashes or lesions    LABS:	 	                        14.3   7.51  )-----------( 196      ( 23 Mar 2018 05:16 )             44.4     03-23    137  |  95<L>  |  21  ----------------------------<  262<H>  4.1   |  21<L>  |  1.16  03-23    134<L>  |  92<L>  |  23  ----------------------------<  339<H>  4.1   |  18<L>  |  1.26    Ca    8.7      23 Mar 2018 05:16  Ca    8.6      23 Mar 2018 00:32  Phos  2.6     03-23  Phos  3.2     03-23  Mg     2.3     03-23  Mg     2.3     03-23    TPro  9.0<H>  /  Alb  4.5  /  TBili  0.6  /  DBili  x   /  AST  30  /  ALT  39  /  AlkPhos  90  03-22      FS: CAPILLARY BLOOD GLUCOSE      POCT Blood Glucose.: 298 mg/dL (23 Mar 2018 08:45)  POCT Blood Glucose.: 296 mg/dL (23 Mar 2018 02:09)  POCT Blood Glucose.: 300 mg/dL (22 Mar 2018 23:58)  POCT Blood Glucose.: 300 mg/dL (22 Mar 2018 21:26)  POCT Blood Glucose.: 305 mg/dL (22 Mar 2018 19:59)  POCT Blood Glucose.: 350 mg/dL (22 Mar 2018 17:47)  POCT Blood Glucose.: 466 mg/dL (22 Mar 2018 16:21)  POCT Blood Glucose.: 564 mg/dL (22 Mar 2018 14:02)      	    ECG:  	  RADIOLOGY: Booker Alfaro MD, Internal Medicine, PGY1  Pager - [facility]: [phone] ; [facility]: [phone]    MEDICINE ACCEPT NOTE    CC: Patient is a 67y old  Male who presents with a chief complaint of Hyperglycemia (22 Mar 2018 23:47)      HPI: Patient is a 66 y/o M w/ a PMHx of HTN and T2DM who was sent to the ED by his PMD for hyperglycemia. Patient reports that he was in his usual state of health until 2-3 weeks ago when he began to develop vague abdominal complaints. He explains that he started to feel a "shaking" diffusely throughout his abdomen which felt very strange. The sensation was intermittent and he was unsure of any aggravating/relieving factors. No known association with food. He states that he was drinking a lot of juice and taking OTC DayQuil and Nyquil for his symptoms without relief. During this time, patient also reports worsening polyuria/polydipsia as well as fatigue. Since patient's symptoms were not improving, he scheduled an appointment with his PMD who he visited on day of presentation. Patient was found to have a FS > 500 and so he was advised to go to the ED for further evaluation. On ROS, patient endorses dry mouth, but denies any recent fever, chills, headache, dizziness, vision changes, chest pain, palpitations, shortness of breath, cough, vomiting, diarrhea, or dysuria. Of note, patient states that his PMD prescribes him Atenolol-Chlorthalidone (100mg/25mg) for HTN and Glipizide (unknown dose) for T2DM; however, he reports that he has seldom been taking these medications. He reports that he recently visited his family in [facility] in 1/2018.    In the ED, patient's VS were: T = 98.7F, HR = 77, BP = 136/98, RR = 16, O2 Sat = 100% on RA. Labs were significant for hyperglycemia (serum glucose = 627), AIYANA (Cr = 1.81), AG = 38, Beta-Hydroxybutyrate of 5.2, lactate of 4.6, pH of 7.34, and a Hemoglobin A1c of 15.8%. Patient was given 3L NS, IV Regular Insulin 7U x1, Lantus 12U x1, PO KCl 40 mEq x1, and was started on LR @ 125cc/hr. Patient's serum glucose improved to 342, AG improved to 27, and lactate improved to 3.0. He was then admitted to Medicine for further management.    This AM, Gap improved to 24 and patient w/o complaints. Patient states that he was diagnosed with T2DM appx 20 years ago but has been non-compliant with his medications during this entire time span. Pt states his prior PMD visit before being seen on day of presentation was about 3 years ago and currently does not take any diabetic medications.    REVIEW OF SYSTEMS:    CONSTITUTIONAL: +chills; no weakness, fatigue, malaise, fevers no weight change, appetite change  EYES: No visual changes; No double vision  Ears: no otalgia, no otorrhea, no hearing loss  Nose: no epistaxis, rhinorrhea  Throat: no throat pain, no oral lesions  NECK: No pain or stiffness  RESPIRATORY: No cough (productive or dry), wheezing, hemoptysis; No shortness of breath  CARDIOVASCULAR: No chest pain or palpitations, no leg edema, no claudication  GASTROINTESTINAL: +abdominal discomfort. No nausea, vomiting, or hematemesis; No diarrhea or constipation. No melena or hematochezia.  GENITOURINARY: No dysuria, frequency, urgency or hematuria, no pelvic pain, urinary incontinence, +polyuria  Muscloskeletal: no joints or muscle pain, no swelling in joints or muscles  NEUROLOGICAL: No numbness or weakness, headache, dizziness  SKIN: No pruritis, rashes, lesions or new moles, no wounds  Endocrine: No Heat or Cold intolerance, +polydipsia  Heme/Lymph: no LN enlargement, no easy bruising or bleeding       Allergies    No Known Allergies    Intolerances    	    PAST MEDICAL & SURGICAL HISTORY:  Diabetes mellitus  Hypertension  No significant past surgical history      FAMILY HISTORY:  No pertinent family history in first degree relatives      SOCIAL HISTORY:  Retired. Seldom ETOH, denies tobacco, illicits. Splits residences between [place] and [facility].    MEDICATIONS:  heparin  Injectable 5000 Unit(s) SubCutaneous every 8 hours            dextrose 50% Injectable 12.5 Gram(s) IV Push once  dextrose 50% Injectable 25 Gram(s) IV Push once  dextrose 50% Injectable 25 Gram(s) IV Push once  dextrose Gel 1 Dose(s) Oral once PRN  glucagon  Injectable 1 milliGRAM(s) IntraMuscular once PRN  insulin glargine Injectable (LANTUS) 12 Unit(s) SubCutaneous at bedtime  insulin lispro (HumaLOG) corrective regimen sliding scale   SubCutaneous three times a day before meals  insulin lispro (HumaLOG) corrective regimen sliding scale   SubCutaneous at bedtime    dextrose 5%. 1000 milliLiter(s) IV Continuous <Continuous>  multivitamin 1 Tablet(s) Oral daily      PHYSICAL EXAM:  T(C): 37.2 (03-23-18 @ 05:27), Max: 37.2 (03-23-18 @ 05:27)  HR: 70 (03-23-18 @ 05:27) (70 - 77)  BP: 142/89 (03-23-18 @ 05:27) (128/88 - 161/99)  RR: 16 (03-23-18 @ 05:27) (16 - 20)  SpO2: 100% (03-23-18 @ 05:27) (100% - 100%)  Wt(kg): --  Daily Height in cm: 170.18 (22 Mar 2018 22:47)    Daily   I&O's Summary    23 Mar 2018 07:01  -  23 Mar 2018 09:15  --------------------------------------------------------  IN: 0 mL / OUT: 300 mL / NET: -300 mL      TELEMETRY:     PHYSICAL EXAM  GENERAL: NAD, well-developed  HEAD: NC/AT  Eyes: EOMI, PERRLA, conjunctiva and sclera clear  NECK: Supple, No JVD  CHEST/LUNG: CTAB, No wheeze  HEART: RRR, no m/r/g  ABDOMEN: Soft, NT/ND, +BS  EXTREMITIES: 2+ peripheral pulses, no clubbing, cyanosis, edema, R leg medial healed wound that pt states is was from an accident during his childhood. Otherwise, no wounds on extremities b/l  NEURO: AO3, No focal deficits, CN II-XII intact, tactile and pain sensations intact in LE b/l.  PSYCH: Appropriate mood and affect  SKIN: No rashes or lesions    LABS:	 	                        14.3   7.51  )-----------( 196      ( 23 Mar 2018 05:16 )             44.4     03-23    137  |  95<L>  |  21  ----------------------------<  262<H>  4.1   |  21<L>  |  1.16  03-23    134<L>  |  92<L>  |  23  ----------------------------<  339<H>  4.1   |  18<L>  |  1.26    Ca    8.7      23 Mar 2018 05:16  Ca    8.6      23 Mar 2018 00:32  Phos  2.6     03-23  Phos  3.2     03-23  Mg     2.3     03-23  Mg     2.3     03-23    TPro  9.0<H>  /  Alb  4.5  /  TBili  0.6  /  DBili  x   /  AST  30  /  ALT  39  /  AlkPhos  90  03-22      FS: CAPILLARY BLOOD GLUCOSE      POCT Blood Glucose.: 298 mg/dL (23 Mar 2018 08:45)  POCT Blood Glucose.: 296 mg/dL (23 Mar 2018 02:09)  POCT Blood Glucose.: 300 mg/dL (22 Mar 2018 23:58)  POCT Blood Glucose.: 300 mg/dL (22 Mar 2018 21:26)  POCT Blood Glucose.: 305 mg/dL (22 Mar 2018 19:59)  POCT Blood Glucose.: 350 mg/dL (22 Mar 2018 17:47)  POCT Blood Glucose.: 466 mg/dL (22 Mar 2018 16:21)  POCT Blood Glucose.: 564 mg/dL (22 Mar 2018 14:02)      	    ECG:  	  RADIOLOGY:

## 2018-03-23 NOTE — CONSULT NOTE ADULT - PROBLEM SELECTOR RECOMMENDATION 3
Patietn would benefit from additon of statin given age and presnce od Dm Patient would benefit from addition of statin given age and presence of DM

## 2018-03-24 VITALS — WEIGHT: 161.82 LBS

## 2018-03-24 LAB
ALBUMIN SERPL ELPH-MCNC: 3.3 G/DL — SIGNIFICANT CHANGE UP (ref 3.3–5)
ALP SERPL-CCNC: 63 U/L — SIGNIFICANT CHANGE UP (ref 40–120)
ALT FLD-CCNC: 34 U/L — SIGNIFICANT CHANGE UP (ref 4–41)
AST SERPL-CCNC: 57 U/L — HIGH (ref 4–40)
BASOPHILS # BLD AUTO: 0.01 K/UL — SIGNIFICANT CHANGE UP (ref 0–0.2)
BASOPHILS NFR BLD AUTO: 0.2 % — SIGNIFICANT CHANGE UP (ref 0–2)
BILIRUB SERPL-MCNC: 0.5 MG/DL — SIGNIFICANT CHANGE UP (ref 0.2–1.2)
BUN SERPL-MCNC: 13 MG/DL — SIGNIFICANT CHANGE UP (ref 7–23)
CALCIUM SERPL-MCNC: 8.5 MG/DL — SIGNIFICANT CHANGE UP (ref 8.4–10.5)
CHLORIDE SERPL-SCNC: 98 MMOL/L — SIGNIFICANT CHANGE UP (ref 98–107)
CO2 SERPL-SCNC: 24 MMOL/L — SIGNIFICANT CHANGE UP (ref 22–31)
CREAT SERPL-MCNC: 0.95 MG/DL — SIGNIFICANT CHANGE UP (ref 0.5–1.3)
EOSINOPHIL # BLD AUTO: 0.43 K/UL — SIGNIFICANT CHANGE UP (ref 0–0.5)
EOSINOPHIL NFR BLD AUTO: 9.5 % — HIGH (ref 0–6)
GLUCOSE SERPL-MCNC: 200 MG/DL — HIGH (ref 70–99)
HCT VFR BLD CALC: 41.4 % — SIGNIFICANT CHANGE UP (ref 39–50)
HGB BLD-MCNC: 13.5 G/DL — SIGNIFICANT CHANGE UP (ref 13–17)
IMM GRANULOCYTES # BLD AUTO: 0.01 # — SIGNIFICANT CHANGE UP
IMM GRANULOCYTES NFR BLD AUTO: 0.2 % — SIGNIFICANT CHANGE UP (ref 0–1.5)
LACTATE SERPL-SCNC: 1.4 MMOL/L — SIGNIFICANT CHANGE UP (ref 0.5–2)
LYMPHOCYTES # BLD AUTO: 1.63 K/UL — SIGNIFICANT CHANGE UP (ref 1–3.3)
LYMPHOCYTES # BLD AUTO: 36 % — SIGNIFICANT CHANGE UP (ref 13–44)
MAGNESIUM SERPL-MCNC: 1.8 MG/DL — SIGNIFICANT CHANGE UP (ref 1.6–2.6)
MCHC RBC-ENTMCNC: 27.9 PG — SIGNIFICANT CHANGE UP (ref 27–34)
MCHC RBC-ENTMCNC: 32.6 % — SIGNIFICANT CHANGE UP (ref 32–36)
MCV RBC AUTO: 85.5 FL — SIGNIFICANT CHANGE UP (ref 80–100)
MONOCYTES # BLD AUTO: 0.69 K/UL — SIGNIFICANT CHANGE UP (ref 0–0.9)
MONOCYTES NFR BLD AUTO: 15.2 % — HIGH (ref 2–14)
NEUTROPHILS # BLD AUTO: 1.76 K/UL — LOW (ref 1.8–7.4)
NEUTROPHILS NFR BLD AUTO: 38.9 % — LOW (ref 43–77)
NRBC # FLD: 0 — SIGNIFICANT CHANGE UP
PHOSPHATE SERPL-MCNC: 2.6 MG/DL — SIGNIFICANT CHANGE UP (ref 2.5–4.5)
PLATELET # BLD AUTO: 164 K/UL — SIGNIFICANT CHANGE UP (ref 150–400)
PMV BLD: 11.2 FL — SIGNIFICANT CHANGE UP (ref 7–13)
POTASSIUM SERPL-MCNC: 3.6 MMOL/L — SIGNIFICANT CHANGE UP (ref 3.5–5.3)
POTASSIUM SERPL-SCNC: 3.6 MMOL/L — SIGNIFICANT CHANGE UP (ref 3.5–5.3)
PROT SERPL-MCNC: 6.7 G/DL — SIGNIFICANT CHANGE UP (ref 6–8.3)
RBC # BLD: 4.84 M/UL — SIGNIFICANT CHANGE UP (ref 4.2–5.8)
RBC # FLD: 12.8 % — SIGNIFICANT CHANGE UP (ref 10.3–14.5)
SODIUM SERPL-SCNC: 137 MMOL/L — SIGNIFICANT CHANGE UP (ref 135–145)
WBC # BLD: 4.53 K/UL — SIGNIFICANT CHANGE UP (ref 3.8–10.5)
WBC # FLD AUTO: 4.53 K/UL — SIGNIFICANT CHANGE UP (ref 3.8–10.5)

## 2018-03-24 PROCEDURE — 99239 HOSP IP/OBS DSCHRG MGMT >30: CPT

## 2018-03-24 RX ORDER — INSULIN LISPRO 100/ML
VIAL (ML) SUBCUTANEOUS AT BEDTIME
Qty: 0 | Refills: 0 | Status: DISCONTINUED | OUTPATIENT
Start: 2018-03-24 | End: 2018-03-24

## 2018-03-24 RX ORDER — INSULIN GLARGINE 100 [IU]/ML
20 INJECTION, SOLUTION SUBCUTANEOUS AT BEDTIME
Qty: 0 | Refills: 0 | Status: DISCONTINUED | OUTPATIENT
Start: 2018-03-24 | End: 2018-03-24

## 2018-03-24 RX ORDER — ENOXAPARIN SODIUM 100 MG/ML
20 INJECTION SUBCUTANEOUS
Qty: 30 | Refills: 0 | OUTPATIENT
Start: 2018-03-24 | End: 2018-04-22

## 2018-03-24 RX ORDER — AMLODIPINE BESYLATE 2.5 MG/1
1 TABLET ORAL
Qty: 30 | Refills: 0 | OUTPATIENT
Start: 2018-03-24 | End: 2018-04-22

## 2018-03-24 RX ORDER — ATORVASTATIN CALCIUM 80 MG/1
1 TABLET, FILM COATED ORAL
Qty: 30 | Refills: 0 | OUTPATIENT
Start: 2018-03-24 | End: 2018-04-22

## 2018-03-24 RX ORDER — INSULIN GLARGINE 100 [IU]/ML
20 INJECTION, SOLUTION SUBCUTANEOUS
Qty: 30 | Refills: 0 | OUTPATIENT
Start: 2018-03-24 | End: 2018-04-22

## 2018-03-24 RX ORDER — INSULIN LISPRO 100/ML
8 VIAL (ML) SUBCUTANEOUS
Qty: 720 | Refills: 0 | OUTPATIENT
Start: 2018-03-24 | End: 2018-04-22

## 2018-03-24 RX ORDER — INSULIN LISPRO 100/ML
8 VIAL (ML) SUBCUTANEOUS
Qty: 0 | Refills: 0 | Status: DISCONTINUED | OUTPATIENT
Start: 2018-03-24 | End: 2018-03-24

## 2018-03-24 RX ORDER — INSULIN LISPRO 100/ML
8 VIAL (ML) SUBCUTANEOUS
Qty: 723 | Refills: 0 | OUTPATIENT
Start: 2018-03-24 | End: 2018-04-22

## 2018-03-24 RX ORDER — INSULIN LISPRO 100/ML
VIAL (ML) SUBCUTANEOUS
Qty: 0 | Refills: 0 | Status: DISCONTINUED | OUTPATIENT
Start: 2018-03-24 | End: 2018-03-24

## 2018-03-24 RX ORDER — ISOPROPYL ALCOHOL, BENZOCAINE .7; .06 ML/ML; ML/ML
1 SWAB TOPICAL
Qty: 120 | Refills: 0 | OUTPATIENT
Start: 2018-03-24 | End: 2018-04-22

## 2018-03-24 RX ORDER — ATENOLOL AND CHLORTHALIDONE 50; 25 MG/1; MG/1
1 TABLET ORAL
Qty: 0 | Refills: 0 | COMMUNITY

## 2018-03-24 RX ADMIN — Medication 2: at 18:01

## 2018-03-24 RX ADMIN — Medication 6 UNIT(S): at 09:18

## 2018-03-24 RX ADMIN — Medication 2: at 09:17

## 2018-03-24 RX ADMIN — AMLODIPINE BESYLATE 5 MILLIGRAM(S): 2.5 TABLET ORAL at 05:52

## 2018-03-24 RX ADMIN — Medication 1 TABLET(S): at 12:37

## 2018-03-24 RX ADMIN — Medication 8 UNIT(S): at 18:02

## 2018-03-24 NOTE — DISCHARGE NOTE ADULT - MEDICATION SUMMARY - MEDICATIONS TO TAKE
I will START or STAY ON the medications listed below when I get home from the hospital:    Diabetic Testing Strips  -- Use testing strips with glucometer about 3-4 times a day  ICD10: E11.10  -- Indication: For Diabetes mellitus    Lancets  -- Use lancets to prick skin to test blood sugar about 3- 4 times a day   ICD10: E11.10  -- Indication: For Diabetes mellitus    Glucometer  -- Use glucometer to check blood sugar about 3-  4 times a day before breakfast, lunch, dinner and/or bedtime.  Please record blood sugars.  -- Indication: For Diabetes mellitus    Alcohol Swabs  -- Use to clean surface of skin prior to using lancet about 3-4 times a day   ICD10:E11.10  -- Indication: For Diabetes mellitus    Lantus Solostar Pen 100 units/mL subcutaneous solution  -- 20  subcutaneous once a day (at bedtime)   -- Do not drink alcoholic beverages when taking this medication.  It is very important that you take or use this exactly as directed.  Do not skip doses or discontinue unless directed by your doctor.  Keep in refrigerator.  Do not freeze.    -- Indication: For Diabetes mellitus    HumaLOG KwikPen 100 units/mL injectable solution  -- 8 milliliter(s) injectable 3 times a day before meals  -- Indication: For Diabetes mellitus    atorvastatin 40 mg oral tablet  -- 1 tab(s) by mouth once a day (at bedtime)  -- Indication: For Hyperlipidemia    amLODIPine 5 mg oral tablet  -- 1 tab(s) by mouth once a day  -- Indication: For Hypertension    Multiple Vitamins oral tablet  -- 1 tab(s) by mouth once a day  -- Indication: For Supplement

## 2018-03-24 NOTE — DIETITIAN INITIAL EVALUATION ADULT. - MD RECOMMEND
1. Recommend change diet to Low Na/CSTCHOSN. 2. Suggest outpatient follow-up with an endocrinologist and Registered Dietitian to ensure long-term diabetes diet comprehension and adherence

## 2018-03-24 NOTE — DISCHARGE NOTE ADULT - CONDITIONS AT DISCHARGE
Stable Patient A&Ox4, no c/o pain, fs checked, coverage given as ordered, pt discharged to home this evening, no distress noted.

## 2018-03-24 NOTE — DISCHARGE NOTE ADULT - ADDITIONAL INSTRUCTIONS
You need to establish care with an endocrinologist (diabetes specialist).  Please call the [address] [place] [address] office at [phone] to schedule an appointment within the next 1-2 weeks after hospital discharge.  You can also call Dr. Alicea's office at [phone] to follow directly with her.

## 2018-03-24 NOTE — PROGRESS NOTE ADULT - ASSESSMENT
67 yr M with DM2 uncontrolled here with no known complications here with DKA and found to have A1C 15.8
Patient is a 68 y/o M w/ a PMHx of HTN and T2DM who was sent to the ED by his PMD for hyperglycemia, found to have AGMA a/w AIYANA and elevated lactate 2/2 uncontrolled DM.
67M hx HTN and T2DM who was sent to the ED by his PMD for hyperglycemia, found to have AGMA a/w AIYANA and elevated lactate 2/2 uncontrolled DM HgbA1c 15.8

## 2018-03-24 NOTE — PROGRESS NOTE ADULT - ATTENDING COMMENTS
Patient seen and examined. Agree with above note by resident.    # DM2 with hyperglycemia- Mild DKA possible given beta hydroxybuterate and elevated gap. Patient with new onset diabetes. Patient educated on insulin and diet. Seen by diabetic educator and endocrine. C/w basal bolus insulin. Monitor FS. Start statin. Needs outpt follow up  #HTn - gaol sbp<140, start norvasc   #Jennifer - resolved with IVF    plan discussed with patient . DC home today. Time spent>30 mins
Patient seen and examined. Agree with above note by resident.    # DM2 with hyperglycemia- Mild DKA possible given beta hydroxybuterate and elevated gap. Patient with new onset diabetes. Patient educated on insulin and diet. Seen by diabetic educator and endocrine. C/w basal bolus insulin. Monitor FS. Start statin.   #HTn - gaol sbp<140, start norvasc   #Jennifer - resolved with IVF    plan discussed with patient

## 2018-03-24 NOTE — DIETITIAN INITIAL EVALUATION ADULT. - OTHER INFO
Dx: Uncontrolled DM, AGMA, AIYANA. Patient is a 68 y/o M w/ a PMHx of HTN and T2DM who was sent to the ED by his PMD for hyperglycemia, found to have AGMA a/w AIYANA and elevated lactate 2/2 uncontrolled DM (HgbA1c 15.8). Pt states everything is back to normal and eating well. Denies chewing, swallowing difficulties or any nausea, vomiting, diarrhea, constipation. Noted Pt was seen by RN/CDE and provided with written information. Pt was encouraged Consistent CHO eating pattern. Remains with fair understating, encouraged outpatient follow up as well for continued follow up. Dx: Uncontrolled DM, AGMA, AIYANA. Patient is a 68 y/o M w/ a PMHx of HTN and T2DM who was sent to the ED by his PMD for hyperglycemia, found to have AGMA a/w AIYANA and elevated lactate 2/2 uncontrolled DM (HgbA1c 15.8). Pt states everything is back to normal and eating well. Denies chewing, swallowing difficulties or any nausea, vomiting, diarrhea, constipation. Noted Pt was seen by RN/CDE and provided with written information. Pt was encouraged Consistent CHO eating pattern. Remains with fair understating, encouraged outpatient Endocrine visit as well for continued follow up.

## 2018-03-24 NOTE — DISCHARGE NOTE ADULT - CARE PROVIDER_API CALL
Georgie Alicea), EndocrinologyMetabDiabetes; Internal Medicine  [address] [place] [id number]  Phone: [phone]  Fax: [phone]

## 2018-03-24 NOTE — PROGRESS NOTE ADULT - PROBLEM SELECTOR PLAN 1
- increase Lantus to 20u at bedtime  - increase Humalog to 8u TID with meals  - change scale to moderate dose with meals and at bedtime  - patient understands how to administer insulin, will discharge with basal/bolus regimen dose TBD

## 2018-03-24 NOTE — PROGRESS NOTE ADULT - SUBJECTIVE AND OBJECTIVE BOX
Patient is a 67y old  Male who presents with a chief complaint of Hyperglycemia (22 Mar 2018 23:47)      INTERVAL HPI/OVERNIGHT EVENTS:    Medications:MEDICATIONS  (STANDING):  amLODIPine   Tablet 5 milliGRAM(s) Oral daily  atorvastatin 40 milliGRAM(s) Oral at bedtime  dextrose 5%. 1000 milliLiter(s) (50 mL/Hr) IV Continuous <Continuous>  dextrose 50% Injectable 12.5 Gram(s) IV Push once  dextrose 50% Injectable 25 Gram(s) IV Push once  dextrose 50% Injectable 25 Gram(s) IV Push once  insulin glargine Injectable (LANTUS) 16 Unit(s) SubCutaneous at bedtime  insulin lispro (HumaLOG) corrective regimen sliding scale   SubCutaneous three times a day before meals  insulin lispro (HumaLOG) corrective regimen sliding scale   SubCutaneous at bedtime  insulin lispro Injectable (HumaLOG) 6 Unit(s) SubCutaneous three times a day before meals  multivitamin 1 Tablet(s) Oral daily  sodium chloride 0.9%. 1000 milliLiter(s) (100 mL/Hr) IV Continuous <Continuous>    MEDICATIONS  (PRN):  dextrose Gel 1 Dose(s) Oral once PRN Blood Glucose LESS THAN 70 milliGRAM(s)/deciliter  glucagon  Injectable 1 milliGRAM(s) IntraMuscular once PRN Glucose LESS THAN 70 milligrams/deciliter      Allergies: Allergies    No Known Allergies    Intolerances        REVIEW OF SYSTEMS:  CONSTITUTIONAL: No fever, weight loss, or fatigue  EYES: No eye pain, visual disturbances, or discharge  ENMT:  No difficulty hearing, tinnitus, vertigo; No sinus or throat pain  NECK: No pain or stiffness  BREASTS: No pain, masses, or nipple discharge  RESPIRATORY: No cough, wheezing, chills or hemoptysis; No shortness of breath  CARDIOVASCULAR: No chest pain, palpitations, dizziness, or leg swelling  GASTROINTESTINAL: No abdominal or epigastric pain. No nausea, vomiting, or hematemesis; No diarrhea or constipation. No melena or hematochezia.  GENITOURINARY: No dysuria, frequency, hematuria, or incontinence  NEUROLOGICAL: No headaches, memory loss, loss of strength, numbness, or tremors  SKIN: No itching, burning, rashes, or lesions   LYMPH NODES: No enlarged glands  ENDOCRINE: No heat or cold intolerance; No hair loss  MUSCULOSKELETAL: No joint pain or swelling; No muscle, back, or extremity pain  PSYCHIATRIC: No depression, anxiety, mood swings, or difficulty sleeping  HEME/LYMPH: No easy bruising, or bleeding gums  ALLERY AND IMMUNOLOGIC: No hives or eczema    T(C): 36.9 (18 @ 05:50), Max: 37 (18 @ 21:50)  HR: 72 (18 @ 05:50) (72 - 86)  BP: 124/72 (18 @ 05:50) (121/63 - 145/94)  RR: 18 (18 @ 05:50) (17 - 18)  SpO2: 100% (18 @ 05:50) (98% - 100%)  Wt(kg): --Vital Signs Last 24 Hrs  T(C): 36.9 (24 Mar 2018 05:50), Max: 37 (23 Mar 2018 21:50)  T(F): 98.5 (24 Mar 2018 05:50), Max: 98.6 (23 Mar 2018 21:50)  HR: 72 (24 Mar 2018 05:50) (72 - 86)  BP: 124/72 (24 Mar 2018 05:50) (121/63 - 145/94)  BP(mean): --  RR: 18 (24 Mar 2018 05:50) (17 - 18)  SpO2: 100% (24 Mar 2018 05:50) (98% - 100%)  I&O's Summary    23 Mar 2018 07:01  -  24 Mar 2018 07:00  --------------------------------------------------------  IN: 800 mL / OUT: 1200 mL / NET: -400 mL        PHYSICAL EXAM:  GENERAL: NAD, well-groomed, well-developed  HEAD:  Atraumatic, Normocephalic  EYES: EOMI, PERRLA, conjunctiva and sclera clear  ENMT: No tonsillar erythema, exudates, or enlargement; Moist mucous membranes, Good dentition, No lesions  NECK: Supple, No JVD, Normal thyroid  NERVOUS SYSTEM:  Alert & Oriented X3, Good concentration; Motor Strength 5/5 B/L upper and lower extremities; DTRs 2+ intact and symmetric  CHEST/LUNG: Clear to percussion bilaterally; No rales, rhonchi, wheezing, or rubs  HEART: Regular rate and rhythm; No murmurs, rubs, or gallops  ABDOMEN: Soft, Nontender, Nondistended; Bowel sounds present  EXTREMITIES:  2+ Peripheral Pulses, No clubbing, cyanosis, or edema  LYMPH: No lymphadenopathy noted  SKIN: No rashes or lesions    Consultant(s) Notes Reviewed:  [x ] YES  [ ] NO  Care Discussed with Consultants/Other Providers [ x] YES  [ ] NO    LABS:                        13.5   4.53  )-----------( 164      ( 24 Mar 2018 05:45 )             41.4     03-24    137  |  98  |  13  ----------------------------<  200<H>  3.6   |  24  |  0.95    Ca    8.5      24 Mar 2018 05:45  Phos  2.6     -24  Mg     1.8     -    TPro  6.7  /  Alb  3.3  /  TBili  0.5  /  DBili  x   /  AST  57<H>  /  ALT  34  /  AlkPhos  63  03-24      Urinalysis Basic - ( 23 Mar 2018 06:45 )    Color: PLYEL / Appearance: CLEAR / S.029 / pH: 5.5  Gluc: >1000 / Ketone: LARGE  / Bili: NEGATIVE / Urobili: NORMAL mg/dL   Blood: NEGATIVE / Protein: 10 mg/dL / Nitrite: NEGATIVE   Leuk Esterase: NEGATIVE / RBC: 0-2 / WBC 0-2   Sq Epi: x / Non Sq Epi: x / Bacteria: x      CAPILLARY BLOOD GLUCOSE      POCT Blood Glucose.: 221 mg/dL (24 Mar 2018 08:46)  POCT Blood Glucose.: 276 mg/dL (23 Mar 2018 22:29)  POCT Blood Glucose.: 170 mg/dL (23 Mar 2018 18:17)  POCT Blood Glucose.: 224 mg/dL (23 Mar 2018 12:18)        Urinalysis Basic - ( 23 Mar 2018 06:45 )    Color: PLYEL / Appearance: CLEAR / S.029 / pH: 5.5  Gluc: >1000 / Ketone: LARGE  / Bili: NEGATIVE / Urobili: NORMAL mg/dL   Blood: NEGATIVE / Protein: 10 mg/dL / Nitrite: NEGATIVE   Leuk Esterase: NEGATIVE / RBC: 0-2 / WBC 0-2   Sq Epi: x / Non Sq Epi: x / Bacteria: x        RADIOLOGY & ADDITIONAL TESTS:    Imaging Personally Reviewed:  [ ] YES  [ ] NO Patient is a 67y old  Male who presents with a chief complaint of Hyperglycemia (22 Mar 2018 23:47)    INTERVAL HPI/OVERNIGHT EVENTS:  No major overnight events.  Being followed by endocrine who has been helping adjust insulin regimen.  Pt feels ready to go home.     Medications:MEDICATIONS  (STANDING):  amLODIPine   Tablet 5 milliGRAM(s) Oral daily  atorvastatin 40 milliGRAM(s) Oral at bedtime  dextrose 5%. 1000 milliLiter(s) (50 mL/Hr) IV Continuous <Continuous>  dextrose 50% Injectable 12.5 Gram(s) IV Push once  dextrose 50% Injectable 25 Gram(s) IV Push once  dextrose 50% Injectable 25 Gram(s) IV Push once  insulin glargine Injectable (LANTUS) 16 Unit(s) SubCutaneous at bedtime  insulin lispro (HumaLOG) corrective regimen sliding scale   SubCutaneous three times a day before meals  insulin lispro (HumaLOG) corrective regimen sliding scale   SubCutaneous at bedtime  insulin lispro Injectable (HumaLOG) 6 Unit(s) SubCutaneous three times a day before meals  multivitamin 1 Tablet(s) Oral daily  sodium chloride 0.9%. 1000 milliLiter(s) (100 mL/Hr) IV Continuous <Continuous>    MEDICATIONS  (PRN):  dextrose Gel 1 Dose(s) Oral once PRN Blood Glucose LESS THAN 70 milliGRAM(s)/deciliter  glucagon  Injectable 1 milliGRAM(s) IntraMuscular once PRN Glucose LESS THAN 70 milligrams/deciliter      ALLERGIES:  No Known Allergies  Intolerances    GENERAL: No fevers or chills  RESPIRATORY:  No cough or shortness of breath  CARDIAC:  No chest pain or palpitations  ABDOMEN: No abdominal pain, nausea, vomiting, or bowel changes  GENITOURINARY:  No urinary frequency or dysuria  NEUROLOGIC: No headache or focal weakness  PSYCHIATRIC: No anxiety or behavioral disturbance  ENDOCRINE: +HYPERGLYCEMIA  SKIN: No rash or lesions    VITALS:  T(C): 36.9 (18 @ 05:50), Max: 37 (18 @ 21:50)  HR: 72 (18 @ 05:50) (72 - 86)  BP: 124/72 (18 @ 05:50) (121/63 - 145/94)  RR: 18 (18 @ 05:50) (17 - 18)  SpO2: 100% (18 @ 05:50) (98% - 100%)  Wt(kg): --Vital Signs Last 24 Hrs  T(C): 36.9 (24 Mar 2018 05:50), Max: 37 (23 Mar 2018 21:50)  T(F): 98.5 (24 Mar 2018 05:50), Max: 98.6 (23 Mar 2018 21:50)  HR: 72 (24 Mar 2018 05:50) (72 - 86)  BP: 124/72 (24 Mar 2018 05:50) (121/63 - 145/94)  BP(mean): --  RR: 18 (24 Mar 2018 05:50) (17 - 18)  SpO2: 100% (24 Mar 2018 05:50) (98% - 100%)  I&O's Summary    23 Mar 2018 07:01  -  24 Mar 2018 07:00  --------------------------------------------------------  IN: 800 mL / OUT: 1200 mL / NET: -400 mL      PHYSICAL EXAM:  GENRAL:  Well-appearing, NAD  HEAD: Normocephalic, atraumatic   RESP:  Non-labored breathing pattern, lungs clear to ausculation in anterior fields  CARDIAC: Regular rate and rhtyhm, no murmurs appreciated  ABD: Soft, non-tender, non-distended  EXT: No LE edema  NEURO: Awake, alert, conversant  PSYCH: Calm, cooperative    Consultant(s) Notes Reviewed:  [x ] YES  [ ] NO      LABS:                        13.5   4.53  )-----------( 164      ( 24 Mar 2018 05:45 )             41.4     03-24    137  |  98  |  13  ----------------------------<  200<H>  3.6   |  24  |  0.95    Ca    8.5      24 Mar 2018 05:45  Phos  2.6     03-24  Mg     1.8     -24    TPro  6.7  /  Alb  3.3  /  TBili  0.5  /  DBili  x   /  AST  57<H>  /  ALT  34  /  AlkPhos  63  03-24      Urinalysis Basic - ( 23 Mar 2018 06:45 )    Color: PLYEL / Appearance: CLEAR / S.029 / pH: 5.5  Gluc: >1000 / Ketone: LARGE  / Bili: NEGATIVE / Urobili: NORMAL mg/dL   Blood: NEGATIVE / Protein: 10 mg/dL / Nitrite: NEGATIVE   Leuk Esterase: NEGATIVE / RBC: 0-2 / WBC 0-2   Sq Epi: x / Non Sq Epi: x / Bacteria: x      CAPILLARY BLOOD GLUCOSE  POCT Blood Glucose.: 221 mg/dL (24 Mar 2018 08:46)  POCT Blood Glucose.: 276 mg/dL (23 Mar 2018 22:29)  POCT Blood Glucose.: 170 mg/dL (23 Mar 2018 18:17)  POCT Blood Glucose.: 224 mg/dL (23 Mar 2018 12:18)      Urinalysis Basic - ( 23 Mar 2018 06:45 )    Color: PLYEL / Appearance: CLEAR / S.029 / pH: 5.5  Gluc: >1000 / Ketone: LARGE  / Bili: NEGATIVE / Urobili: NORMAL mg/dL   Blood: NEGATIVE / Protein: 10 mg/dL / Nitrite: NEGATIVE   Leuk Esterase: NEGATIVE / RBC: 0-2 / WBC 0-2   Sq Epi: x / Non Sq Epi: x / Bacteria: x

## 2018-03-24 NOTE — DISCHARGE NOTE ADULT - MEDICATION SUMMARY - MEDICATIONS TO STOP TAKING
I will STOP taking the medications listed below when I get home from the hospital:    atenolol-chlorthalidone 100 mg-25 mg oral tablet  -- 1 tab(s) by mouth once a day

## 2018-03-24 NOTE — DISCHARGE NOTE ADULT - CARE PLAN
Principal Discharge DX:	Diabetes mellitus with hyperglycemia  Goal:	Compliance with insulin therapy.  Outpatient endocrine follow up.  Assessment and plan of treatment:	You were hospitalized due to very elevated blood sugars due to uncontrolled diabetes mellitus.  You were started on insulin therapy and taught by our endocrine team.  Please administer lantus 20 units daily at bedtime and humalog 8 units three times a day before meals.  You will be given prescriptions for a glucometer, testing strips and lancets to monitor your blood sugars.  Secondary Diagnosis:	Hypertension  Goal:	Compliance with blood pressure medications.  Outpatient medical follow up.  Assessment and plan of treatment:	You were started on two NEW blood pressure medications.  Continue to take lisinopril and amlodipine as directed.  STOP TAKING atenolol and chlorthalidone.  Secondary Diagnosis:	HLD (hyperlipidemia)  Goal:	Compliance with statin medication.  Outpatient medical follow up.  Assessment and plan of treatment:	You were started on a anti-cholesterol medication known as a 'statin'.  Continue to take atorvastatin 20mg as directed. Principal Discharge DX:	Diabetes mellitus with hyperglycemia  Goal:	Compliance with insulin therapy.  Outpatient endocrine follow up.  Assessment and plan of treatment:	You were hospitalized due to very elevated blood sugars due to uncontrolled diabetes mellitus.  You were started on insulin therapy and taught by our endocrine team.  Please administer lantus 20 units daily at bedtime and humalog 8 units three times a day before meals.  You will be given prescriptions for a glucometer, testing strips and lancets to monitor your blood sugars.  Secondary Diagnosis:	Hypertension  Goal:	Compliance with blood pressure medications.  Outpatient medical follow up.  Assessment and plan of treatment:	You were started on new blood pressure medication called amlodipine.  Continue to take amlodipine as directed.  STOP TAKING atenolol and chlorthalidone.  Secondary Diagnosis:	HLD (hyperlipidemia)  Goal:	Compliance with statin medication.  Outpatient medical follow up.  Assessment and plan of treatment:	You were started on a anti-cholesterol medication known as a 'statin'.  Continue to take atorvastatin as directed.

## 2018-03-24 NOTE — PROGRESS NOTE ADULT - PROBLEM SELECTOR PLAN 3
- Likely prerenal d/t uncontrolled DM. Improving w/ IVF
-Started on lisinopril given hx of T2DM & some proteinuria on UA  -Amlodipine added for better BP control

## 2018-03-24 NOTE — DISCHARGE NOTE ADULT - PATIENT PORTAL LINK FT
You can access the [facility] Portal, offered by [facility], by registering with the following website: [email or web address]

## 2018-03-24 NOTE — DIETITIAN INITIAL EVALUATION ADULT. - PROBLEM SELECTOR PLAN 2
- Patient found to have a high anion gap metabolic acidosis on presentation likely 2/2 AIYANA, elevated lactate, and possible mild DKA.  - AG currently improving w/ IVF and Insulin. C/w diabetes management as noted above  - Monitor BMP

## 2018-03-24 NOTE — PROGRESS NOTE ADULT - SUBJECTIVE AND OBJECTIVE BOX
Chief Complaint: Hyperglycemia    History:  Pt eating well, denies any complaints. FS in 200s today.    MEDICATIONS  (STANDING):  amLODIPine   Tablet 5 milliGRAM(s) Oral daily  atorvastatin 40 milliGRAM(s) Oral at bedtime  dextrose 5%. 1000 milliLiter(s) (50 mL/Hr) IV Continuous <Continuous>  dextrose 50% Injectable 12.5 Gram(s) IV Push once  dextrose 50% Injectable 25 Gram(s) IV Push once  dextrose 50% Injectable 25 Gram(s) IV Push once  insulin glargine Injectable (LANTUS) 20 Unit(s) SubCutaneous at bedtime  insulin lispro (HumaLOG) corrective regimen sliding scale   SubCutaneous three times a day before meals  insulin lispro (HumaLOG) corrective regimen sliding scale   SubCutaneous at bedtime  insulin lispro Injectable (HumaLOG) 8 Unit(s) SubCutaneous three times a day before meals  multivitamin 1 Tablet(s) Oral daily  sodium chloride 0.9%. 1000 milliLiter(s) (100 mL/Hr) IV Continuous <Continuous>    MEDICATIONS  (PRN):  dextrose Gel 1 Dose(s) Oral once PRN Blood Glucose LESS THAN 70 milliGRAM(s)/deciliter  glucagon  Injectable 1 milliGRAM(s) IntraMuscular once PRN Glucose LESS THAN 70 milligrams/deciliter      Allergies    No Known Allergies    Intolerances      Review of Systems:  Constitutional: No fever  Eyes: No blurry vision  Neuro: No tremors  HEENT: No pain  Cardiovascular: No chest pain, palpitations  Respiratory: No SOB, no cough  GI: No nausea, vomiting, abdominal pain  : No dysuria    ALL OTHER SYSTEMS REVIEWED AND NEGATIVE    PHYSICAL EXAM:  VITALS: T(C): 36.9 (03-24-18 @ 05:50)  T(F): 98.5 (03-24-18 @ 05:50), Max: 98.6 (03-23-18 @ 21:50)  HR: 72 (03-24-18 @ 05:50) (72 - 86)  BP: 124/72 (03-24-18 @ 05:50) (121/63 - 145/94)  RR:  (17 - 18)  SpO2:  (98% - 100%)  Wt(kg): --  GENERAL: NAD  RESPIRATORY: Clear to auscultation bilaterally; No rales, rhonchi, wheezing, or rubs  CARDIOVASCULAR: Regular rate and rhythm; No murmurs; no peripheral edema  GI: Soft, nontender, non distended, normal bowel sounds  SKIN: Dry, intact, No rashes or lesions  MUSCULOSKELETAL: Full range of motion, normal strength  NEURO: sensation intact, extraocular movements intact, no tremor, normal reflexes  PSYCH: Alert and oriented x 3, normal affect, normal mood    POCT Blood Glucose.: 258 mg/dL (03-24-18 @ 12:39)  POCT Blood Glucose.: 221 mg/dL (03-24-18 @ 08:46)  POCT Blood Glucose.: 276 mg/dL (03-23-18 @ 22:29)  POCT Blood Glucose.: 170 mg/dL (03-23-18 @ 18:17)  POCT Blood Glucose.: 224 mg/dL (03-23-18 @ 12:18)  POCT Blood Glucose.: 298 mg/dL (03-23-18 @ 08:45)  POCT Blood Glucose.: 296 mg/dL (03-23-18 @ 02:09)  POCT Blood Glucose.: 300 mg/dL (03-22-18 @ 23:58)  POCT Blood Glucose.: 300 mg/dL (03-22-18 @ 21:26)  POCT Blood Glucose.: 305 mg/dL (03-22-18 @ 19:59)  POCT Blood Glucose.: 350 mg/dL (03-22-18 @ 17:47)  POCT Blood Glucose.: 466 mg/dL (03-22-18 @ 16:21)  POCT Blood Glucose.: 564 mg/dL (03-22-18 @ 14:02)      03-24    137  |  98  |  13  ----------------------------<  200<H>  3.6   |  24  |  0.95    EGFR if : 94  EGFR if non : 81    Ca    8.5      03-24  Mg     1.8     03-24  Phos  2.6     03-24    TPro  6.7  /  Alb  3.3  /  TBili  0.5  /  DBili  x   /  AST  57<H>  /  ALT  34  /  AlkPhos  63  03-24          Hemoglobin A1C, Whole Blood: 15.8 % <H> [4.0 - 5.6] (03-22-18 @ 14:30)

## 2018-03-24 NOTE — DISCHARGE NOTE ADULT - HOSPITAL COURSE
67M hx HTN and T2DM and medication non-compliance who was sent to ED on 3/22 for hyperglycemia.  Pt reports long-standing hx of HTN and T2DM and admits to not taking his medications.  He reports that the week prior to admission he felt as if he had the flu and consumed a lot of orange juice and DayQuil.  On admission, his blood sugar was 627 associated with a anion gap metabolic acidosis of 30, B-hydroxy of 5.2 and lactate of 4.6.  He pH was 7.32. His HgbA1c was 15.8.  It was likely that pt had a mild DKA given the ketosis & ketonuria. He was started on basal bolus regimen with adjustments made by the endocrine team.  He was discharged on lantus 20U and humalog 8U TID.  He also had an AIYANA (admission Cr of 1.81).  The AIYANA resolved with IVF.  On the day of d/c, the Cr was 0.95 For HTN, pt was previously on atenolol and chlorthalidone both of which was d/c'ed during hospitalization.  Pt was started on lisinopril given hx of T2DM and mild proteinuria on admission UA.  Amlodipine was added for better BP control.  He was also started on a moderate intensity statin given T2DM. 67M hx HTN and T2DM and medication non-compliance who was sent to ED on 3/22 for hyperglycemia.  Pt reports long-standing hx of HTN and T2DM and admits to not taking his medications.  He reports that the week prior to admission he felt as if he had the flu and consumed a lot of orange juice and DayQuil.  On admission, his blood sugar was 627 associated with a anion gap metabolic acidosis of 30, B-hydroxy of 5.2 and lactate of 4.6.  He pH was 7.32. His HgbA1c was 15.8.  It was likely that pt had a mild DKA given the ketosis & ketonuria. He was started on basal bolus regimen with adjustments made by the endocrine team.  He was discharged on lantus 20U and humalog 8U TID.  He also had an AIYANA (admission Cr of 1.81).  The AIYANA resolved with IVF.  On the day of d/c, the Cr was 0.95 For HTN, pt was previously on atenolol and chlorthalidone both of which was d/c'ed during hospitalization.  Pt was started on amlodipine. He was also started on atorvastatin for T2DM/HLD.

## 2018-03-24 NOTE — PROGRESS NOTE ADULT - PROBLEM SELECTOR PLAN 1
T2DM with severe hyperglycemia, mild ketosis, likely reaching early stages of DKA. Precipitated by med non-compliance. Hyperglycemia has improved with basal-bolus regimen.  -Endocrine following, increased Lantus from 17 to 20U & Humalog from 6U to 8U  -Plan to d/c today with endocrine follow up

## 2018-03-24 NOTE — DISCHARGE NOTE ADULT - PLAN OF CARE
Compliance with insulin therapy.  Outpatient endocrine follow up. You were hospitalized due to very elevated blood sugars due to uncontrolled diabetes mellitus.  You were started on insulin therapy and taught by our endocrine team.  Please administer lantus 20 units daily at bedtime and humalog 8 units three times a day before meals.  You will be given prescriptions for a glucometer, testing strips and lancets to monitor your blood sugars. Compliance with blood pressure medications.  Outpatient medical follow up. You were started on two NEW blood pressure medications.  Continue to take lisinopril and amlodipine as directed.  STOP TAKING atenolol and chlorthalidone. Compliance with statin medication.  Outpatient medical follow up. You were started on a anti-cholesterol medication known as a 'statin'.  Continue to take atorvastatin 20mg as directed. You were started on new blood pressure medication called amlodipine.  Continue to take amlodipine as directed.  STOP TAKING atenolol and chlorthalidone. You were started on a anti-cholesterol medication known as a 'statin'.  Continue to take atorvastatin as directed.

## 2018-03-29 LAB — GAD65 AB SER-MCNC: 0 NMOL/L — SIGNIFICANT CHANGE UP

## 2018-03-30 LAB — ISLET CELL512 AB SER-ACNC: <5 — SIGNIFICANT CHANGE UP

## 2022-01-03 NOTE — ED ADULT NURSE NOTE - FALL HARM RISK CONCLUSION
Fabien Dean is requesting a refill on the following medications:   Requested Prescriptions     Pending Prescriptions Disp Refills    gabapentin (NEURONTIN) 300 MG capsule 90 capsule 1     Sig: Take 1 capsule by mouth 3 times daily for 30 days.  gabapentin (NEURONTIN) 100 MG capsule 30 capsule 1     Sig: Take 1 capsule by mouth nightly for 30 days.  ibuprofen (ADVIL;MOTRIN) 800 MG tablet 30 tablet 1     Sig: Take 1 tablet by mouth daily as needed for Pain       Last OV 9/21/21. Pt has Lumab [facility] scheduled for 1/10/22     Future Appointments   Date Time Provider Radha Zarate   1/10/2022  9:45 AM MD ANDREY Quevedo   1/13/2022  9:30 [facility]       [facility] report sent to Dr. Jimmy Harrison through alternative route for review. Universal Safety Interventions
